# Patient Record
Sex: MALE | Race: BLACK OR AFRICAN AMERICAN | URBAN - METROPOLITAN AREA
[De-identification: names, ages, dates, MRNs, and addresses within clinical notes are randomized per-mention and may not be internally consistent; named-entity substitution may affect disease eponyms.]

---

## 2022-01-03 ENCOUNTER — INPATIENT (INPATIENT)
Facility: HOSPITAL | Age: 52
LOS: 2 days | Discharge: HOME | End: 2022-01-06
Attending: STUDENT IN AN ORGANIZED HEALTH CARE EDUCATION/TRAINING PROGRAM | Admitting: STUDENT IN AN ORGANIZED HEALTH CARE EDUCATION/TRAINING PROGRAM
Payer: COMMERCIAL

## 2022-01-03 VITALS
DIASTOLIC BLOOD PRESSURE: 63 MMHG | WEIGHT: 160.06 LBS | HEART RATE: 96 BPM | OXYGEN SATURATION: 98 % | SYSTOLIC BLOOD PRESSURE: 128 MMHG | RESPIRATION RATE: 20 BRPM

## 2022-01-03 DIAGNOSIS — K92.2 GASTROINTESTINAL HEMORRHAGE, UNSPECIFIED: ICD-10-CM

## 2022-01-03 DIAGNOSIS — D50.9 IRON DEFICIENCY ANEMIA, UNSPECIFIED: ICD-10-CM

## 2022-01-03 DIAGNOSIS — K29.70 GASTRITIS, UNSPECIFIED, WITHOUT BLEEDING: ICD-10-CM

## 2022-01-03 DIAGNOSIS — E51.9 THIAMINE DEFICIENCY, UNSPECIFIED: ICD-10-CM

## 2022-01-03 DIAGNOSIS — E53.8 DEFICIENCY OF OTHER SPECIFIED B GROUP VITAMINS: ICD-10-CM

## 2022-01-03 DIAGNOSIS — F10.10 ALCOHOL ABUSE, UNCOMPLICATED: ICD-10-CM

## 2022-01-03 DIAGNOSIS — K72.00 ACUTE AND SUBACUTE HEPATIC FAILURE WITHOUT COMA: ICD-10-CM

## 2022-01-03 DIAGNOSIS — D53.9 NUTRITIONAL ANEMIA, UNSPECIFIED: ICD-10-CM

## 2022-01-03 DIAGNOSIS — K70.30 ALCOHOLIC CIRRHOSIS OF LIVER WITHOUT ASCITES: ICD-10-CM

## 2022-01-03 DIAGNOSIS — K22.10 ULCER OF ESOPHAGUS WITHOUT BLEEDING: ICD-10-CM

## 2022-01-03 DIAGNOSIS — D69.6 THROMBOCYTOPENIA, UNSPECIFIED: ICD-10-CM

## 2022-01-03 DIAGNOSIS — U07.1 COVID-19: ICD-10-CM

## 2022-01-03 DIAGNOSIS — I85.10 SECONDARY ESOPHAGEAL VARICES WITHOUT BLEEDING: ICD-10-CM

## 2022-01-03 DIAGNOSIS — K22.6 GASTRO-ESOPHAGEAL LACERATION-HEMORRHAGE SYNDROME: ICD-10-CM

## 2022-01-03 DIAGNOSIS — I10 ESSENTIAL (PRIMARY) HYPERTENSION: ICD-10-CM

## 2022-01-03 DIAGNOSIS — K44.9 DIAPHRAGMATIC HERNIA WITHOUT OBSTRUCTION OR GANGRENE: ICD-10-CM

## 2022-01-03 LAB
ALBUMIN SERPL ELPH-MCNC: 3.6 G/DL — SIGNIFICANT CHANGE UP (ref 3.5–5.2)
ALP SERPL-CCNC: 138 U/L — HIGH (ref 30–115)
ALT FLD-CCNC: 31 U/L — SIGNIFICANT CHANGE UP (ref 0–41)
ANION GAP SERPL CALC-SCNC: 21 MMOL/L — HIGH (ref 7–14)
APTT BLD: 34.9 SEC — SIGNIFICANT CHANGE UP (ref 27–39.2)
AST SERPL-CCNC: 140 U/L — HIGH (ref 0–41)
BASOPHILS # BLD AUTO: 0.04 K/UL — SIGNIFICANT CHANGE UP (ref 0–0.2)
BASOPHILS NFR BLD AUTO: 0.4 % — SIGNIFICANT CHANGE UP (ref 0–1)
BILIRUB SERPL-MCNC: 1.6 MG/DL — HIGH (ref 0.2–1.2)
BLD GP AB SCN SERPL QL: SIGNIFICANT CHANGE UP
BUN SERPL-MCNC: 13 MG/DL — SIGNIFICANT CHANGE UP (ref 10–20)
CALCIUM SERPL-MCNC: 8.4 MG/DL — LOW (ref 8.5–10.1)
CHLORIDE SERPL-SCNC: 96 MMOL/L — LOW (ref 98–110)
CO2 SERPL-SCNC: 23 MMOL/L — SIGNIFICANT CHANGE UP (ref 17–32)
CREAT SERPL-MCNC: 0.7 MG/DL — SIGNIFICANT CHANGE UP (ref 0.7–1.5)
EOSINOPHIL # BLD AUTO: 0.01 K/UL — SIGNIFICANT CHANGE UP (ref 0–0.7)
EOSINOPHIL NFR BLD AUTO: 0.1 % — SIGNIFICANT CHANGE UP (ref 0–8)
GLUCOSE SERPL-MCNC: 184 MG/DL — HIGH (ref 70–99)
HCT VFR BLD CALC: 26.2 % — LOW (ref 42–52)
HCT VFR BLD CALC: 33.1 % — LOW (ref 42–52)
HGB BLD-MCNC: 11.1 G/DL — LOW (ref 14–18)
HGB BLD-MCNC: 8.8 G/DL — LOW (ref 14–18)
IMM GRANULOCYTES NFR BLD AUTO: 0.4 % — HIGH (ref 0.1–0.3)
INR BLD: 1.64 RATIO — HIGH (ref 0.65–1.3)
LIDOCAIN IGE QN: 29 U/L — SIGNIFICANT CHANGE UP (ref 7–60)
LYMPHOCYTES # BLD AUTO: 4.69 K/UL — HIGH (ref 1.2–3.4)
LYMPHOCYTES # BLD AUTO: 44.7 % — SIGNIFICANT CHANGE UP (ref 20.5–51.1)
MCHC RBC-ENTMCNC: 24.8 PG — LOW (ref 27–31)
MCHC RBC-ENTMCNC: 25.2 PG — LOW (ref 27–31)
MCHC RBC-ENTMCNC: 33.5 G/DL — SIGNIFICANT CHANGE UP (ref 32–37)
MCHC RBC-ENTMCNC: 33.6 G/DL — SIGNIFICANT CHANGE UP (ref 32–37)
MCV RBC AUTO: 74 FL — LOW (ref 80–94)
MCV RBC AUTO: 75.1 FL — LOW (ref 80–94)
MONOCYTES # BLD AUTO: 1.05 K/UL — HIGH (ref 0.1–0.6)
MONOCYTES NFR BLD AUTO: 10 % — HIGH (ref 1.7–9.3)
NEUTROPHILS # BLD AUTO: 4.67 K/UL — SIGNIFICANT CHANGE UP (ref 1.4–6.5)
NEUTROPHILS NFR BLD AUTO: 44.4 % — SIGNIFICANT CHANGE UP (ref 42.2–75.2)
NRBC # BLD: 0 /100 WBCS — SIGNIFICANT CHANGE UP (ref 0–0)
NRBC # BLD: 0 /100 WBCS — SIGNIFICANT CHANGE UP (ref 0–0)
PLATELET # BLD AUTO: 45 K/UL — LOW (ref 130–400)
PLATELET # BLD AUTO: 63 K/UL — LOW (ref 130–400)
POTASSIUM SERPL-MCNC: 3.8 MMOL/L — SIGNIFICANT CHANGE UP (ref 3.5–5)
POTASSIUM SERPL-SCNC: 3.8 MMOL/L — SIGNIFICANT CHANGE UP (ref 3.5–5)
PROT SERPL-MCNC: 7.6 G/DL — SIGNIFICANT CHANGE UP (ref 6–8)
PROTHROM AB SERPL-ACNC: 18.8 SEC — HIGH (ref 9.95–12.87)
RBC # BLD: 3.49 M/UL — LOW (ref 4.7–6.1)
RBC # BLD: 4.47 M/UL — LOW (ref 4.7–6.1)
RBC # FLD: 14.5 % — SIGNIFICANT CHANGE UP (ref 11.5–14.5)
RBC # FLD: 15.2 % — HIGH (ref 11.5–14.5)
SARS-COV-2 RNA SPEC QL NAA+PROBE: DETECTED
SODIUM SERPL-SCNC: 140 MMOL/L — SIGNIFICANT CHANGE UP (ref 135–146)
WBC # BLD: 10.5 K/UL — SIGNIFICANT CHANGE UP (ref 4.8–10.8)
WBC # BLD: 6.05 K/UL — SIGNIFICANT CHANGE UP (ref 4.8–10.8)
WBC # FLD AUTO: 10.5 K/UL — SIGNIFICANT CHANGE UP (ref 4.8–10.8)
WBC # FLD AUTO: 6.05 K/UL — SIGNIFICANT CHANGE UP (ref 4.8–10.8)

## 2022-01-03 PROCEDURE — 71045 X-RAY EXAM CHEST 1 VIEW: CPT | Mod: 26

## 2022-01-03 PROCEDURE — 99223 1ST HOSP IP/OBS HIGH 75: CPT

## 2022-01-03 PROCEDURE — 76705 ECHO EXAM OF ABDOMEN: CPT | Mod: 26

## 2022-01-03 PROCEDURE — 43235 EGD DIAGNOSTIC BRUSH WASH: CPT

## 2022-01-03 PROCEDURE — 99285 EMERGENCY DEPT VISIT HI MDM: CPT | Mod: 25

## 2022-01-03 PROCEDURE — 74177 CT ABD & PELVIS W/CONTRAST: CPT | Mod: 26

## 2022-01-03 PROCEDURE — 93010 ELECTROCARDIOGRAM REPORT: CPT

## 2022-01-03 PROCEDURE — 99234 HOSP IP/OBS SM DT SF/LOW 45: CPT | Mod: 25

## 2022-01-03 RX ORDER — THIAMINE MONONITRATE (VIT B1) 100 MG
100 TABLET ORAL DAILY
Refills: 0 | Status: DISCONTINUED | OUTPATIENT
Start: 2022-01-03 | End: 2022-01-06

## 2022-01-03 RX ORDER — LACTULOSE 10 G/15ML
15 SOLUTION ORAL
Qty: 0 | Refills: 0 | DISCHARGE

## 2022-01-03 RX ORDER — PANTOPRAZOLE SODIUM 20 MG/1
8 TABLET, DELAYED RELEASE ORAL
Qty: 80 | Refills: 0 | Status: DISCONTINUED | OUTPATIENT
Start: 2022-01-03 | End: 2022-01-04

## 2022-01-03 RX ORDER — SODIUM CHLORIDE 9 MG/ML
1000 INJECTION, SOLUTION INTRAVENOUS ONCE
Refills: 0 | Status: COMPLETED | OUTPATIENT
Start: 2022-01-03 | End: 2022-01-03

## 2022-01-03 RX ORDER — ONDANSETRON 8 MG/1
8 TABLET, FILM COATED ORAL ONCE
Refills: 0 | Status: COMPLETED | OUTPATIENT
Start: 2022-01-03 | End: 2022-01-03

## 2022-01-03 RX ORDER — OCTREOTIDE ACETATE 200 UG/ML
50 INJECTION, SOLUTION INTRAVENOUS; SUBCUTANEOUS ONCE
Refills: 0 | Status: COMPLETED | OUTPATIENT
Start: 2022-01-03 | End: 2022-01-03

## 2022-01-03 RX ORDER — CEFTRIAXONE 500 MG/1
1000 INJECTION, POWDER, FOR SOLUTION INTRAMUSCULAR; INTRAVENOUS ONCE
Refills: 0 | Status: COMPLETED | OUTPATIENT
Start: 2022-01-03 | End: 2022-01-03

## 2022-01-03 RX ORDER — PANTOPRAZOLE SODIUM 20 MG/1
80 TABLET, DELAYED RELEASE ORAL ONCE
Refills: 0 | Status: COMPLETED | OUTPATIENT
Start: 2022-01-03 | End: 2022-01-03

## 2022-01-03 RX ORDER — FUROSEMIDE 40 MG
1 TABLET ORAL
Qty: 0 | Refills: 0 | DISCHARGE

## 2022-01-03 RX ORDER — CHLORHEXIDINE GLUCONATE 213 G/1000ML
1 SOLUTION TOPICAL DAILY
Refills: 0 | Status: DISCONTINUED | OUTPATIENT
Start: 2022-01-03 | End: 2022-01-06

## 2022-01-03 RX ORDER — SODIUM CHLORIDE 9 MG/ML
1000 INJECTION, SOLUTION INTRAVENOUS
Refills: 0 | Status: DISCONTINUED | OUTPATIENT
Start: 2022-01-03 | End: 2022-01-04

## 2022-01-03 RX ORDER — SODIUM CHLORIDE 9 MG/ML
1000 INJECTION INTRAMUSCULAR; INTRAVENOUS; SUBCUTANEOUS
Refills: 0 | Status: DISCONTINUED | OUTPATIENT
Start: 2022-01-03 | End: 2022-01-04

## 2022-01-03 RX ORDER — FOLIC ACID 0.8 MG
1 TABLET ORAL DAILY
Refills: 0 | Status: DISCONTINUED | OUTPATIENT
Start: 2022-01-03 | End: 2022-01-06

## 2022-01-03 RX ORDER — SODIUM CHLORIDE 9 MG/ML
1000 INJECTION INTRAMUSCULAR; INTRAVENOUS; SUBCUTANEOUS ONCE
Refills: 0 | Status: COMPLETED | OUTPATIENT
Start: 2022-01-03 | End: 2022-01-03

## 2022-01-03 RX ORDER — ERYTHROMYCIN ETHYLSUCCINATE 400 MG
250 TABLET ORAL EVERY 6 HOURS
Refills: 0 | Status: COMPLETED | OUTPATIENT
Start: 2022-01-03 | End: 2022-01-04

## 2022-01-03 RX ORDER — OCTREOTIDE ACETATE 200 UG/ML
50 INJECTION, SOLUTION INTRAVENOUS; SUBCUTANEOUS
Qty: 500 | Refills: 0 | Status: DISCONTINUED | OUTPATIENT
Start: 2022-01-03 | End: 2022-01-04

## 2022-01-03 RX ORDER — CEFTRIAXONE 500 MG/1
1000 INJECTION, POWDER, FOR SOLUTION INTRAMUSCULAR; INTRAVENOUS EVERY 24 HOURS
Refills: 0 | Status: DISCONTINUED | OUTPATIENT
Start: 2022-01-03 | End: 2022-01-06

## 2022-01-03 RX ORDER — METOCLOPRAMIDE HCL 10 MG
10 TABLET ORAL ONCE
Refills: 0 | Status: COMPLETED | OUTPATIENT
Start: 2022-01-03 | End: 2022-01-03

## 2022-01-03 RX ORDER — MAGNESIUM SULFATE 500 MG/ML
2 VIAL (ML) INJECTION ONCE
Refills: 0 | Status: COMPLETED | OUTPATIENT
Start: 2022-01-03 | End: 2022-01-03

## 2022-01-03 RX ORDER — PROPRANOLOL HCL 160 MG
0 CAPSULE, EXTENDED RELEASE 24HR ORAL
Qty: 0 | Refills: 0 | DISCHARGE

## 2022-01-03 RX ORDER — FOLIC ACID 0.8 MG
1 TABLET ORAL
Qty: 0 | Refills: 0 | DISCHARGE

## 2022-01-03 RX ORDER — LACTULOSE 10 G/15ML
10 SOLUTION ORAL DAILY
Refills: 0 | Status: DISCONTINUED | OUTPATIENT
Start: 2022-01-03 | End: 2022-01-06

## 2022-01-03 RX ORDER — METOCLOPRAMIDE HCL 10 MG
10 TABLET ORAL
Refills: 0 | Status: DISCONTINUED | OUTPATIENT
Start: 2022-01-03 | End: 2022-01-03

## 2022-01-03 RX ADMIN — Medication 25 GRAM(S): at 04:46

## 2022-01-03 RX ADMIN — SODIUM CHLORIDE 1000 MILLILITER(S): 9 INJECTION, SOLUTION INTRAVENOUS at 06:07

## 2022-01-03 RX ADMIN — PANTOPRAZOLE SODIUM 80 MILLIGRAM(S): 20 TABLET, DELAYED RELEASE ORAL at 06:22

## 2022-01-03 RX ADMIN — OCTREOTIDE ACETATE 10 MICROGRAM(S)/HR: 200 INJECTION, SOLUTION INTRAVENOUS; SUBCUTANEOUS at 06:23

## 2022-01-03 RX ADMIN — OCTREOTIDE ACETATE 10 MICROGRAM(S)/HR: 200 INJECTION, SOLUTION INTRAVENOUS; SUBCUTANEOUS at 09:46

## 2022-01-03 RX ADMIN — Medication 250 MILLIGRAM(S): at 20:43

## 2022-01-03 RX ADMIN — LACTULOSE 10 GRAM(S): 10 SOLUTION ORAL at 11:59

## 2022-01-03 RX ADMIN — PANTOPRAZOLE SODIUM 10 MG/HR: 20 TABLET, DELAYED RELEASE ORAL at 04:51

## 2022-01-03 RX ADMIN — Medication 1 MILLIGRAM(S): at 11:59

## 2022-01-03 RX ADMIN — PANTOPRAZOLE SODIUM 10 MG/HR: 20 TABLET, DELAYED RELEASE ORAL at 20:43

## 2022-01-03 RX ADMIN — SODIUM CHLORIDE 75 MILLILITER(S): 9 INJECTION INTRAMUSCULAR; INTRAVENOUS; SUBCUTANEOUS at 22:29

## 2022-01-03 RX ADMIN — ONDANSETRON 8 MILLIGRAM(S): 8 TABLET, FILM COATED ORAL at 06:10

## 2022-01-03 RX ADMIN — OCTREOTIDE ACETATE 10 MICROGRAM(S)/HR: 200 INJECTION, SOLUTION INTRAVENOUS; SUBCUTANEOUS at 20:43

## 2022-01-03 RX ADMIN — OCTREOTIDE ACETATE 50 MICROGRAM(S): 200 INJECTION, SOLUTION INTRAVENOUS; SUBCUTANEOUS at 06:22

## 2022-01-03 RX ADMIN — SODIUM CHLORIDE 75 MILLILITER(S): 9 INJECTION INTRAMUSCULAR; INTRAVENOUS; SUBCUTANEOUS at 09:21

## 2022-01-03 RX ADMIN — CEFTRIAXONE 100 MILLIGRAM(S): 500 INJECTION, POWDER, FOR SOLUTION INTRAMUSCULAR; INTRAVENOUS at 06:23

## 2022-01-03 RX ADMIN — CEFTRIAXONE 100 MILLIGRAM(S): 500 INJECTION, POWDER, FOR SOLUTION INTRAMUSCULAR; INTRAVENOUS at 22:25

## 2022-01-03 RX ADMIN — Medication 10 MILLIGRAM(S): at 04:45

## 2022-01-03 NOTE — H&P ADULT - NSHPSOCIALHISTORY_GEN_ALL_CORE
Smoker 1/4 pack a day for 30 years. (7 pack years)  Alcohol use: 1 cup of vodka every few days (last drink on 1 day prior to admission)

## 2022-01-03 NOTE — ED PROVIDER NOTE - CLINICAL SUMMARY MEDICAL DECISION MAKING FREE TEXT BOX
I personally evaluated the patient. I reviewed the Resident’s or Physician Assistant’s note (as assigned above), and agree with the findings and plan except as documented in my note. Patient evaluated for hematemesis. Labs, ekg, cxr performed in the ED. Given abx, reglan, ppi, and octreotide in the ED. No active vomiting or airway compromise in the ED. D/w GI and ICU. Pt admitted to SDU for further evaluation and treatment.

## 2022-01-03 NOTE — H&P ADULT - ATTENDING COMMENTS
REVIEW OF SYSTEMS and PHYSICAL EXAM as above.         ASSESSMENT AND PLAN:    51 year old PMH of alcoholic cirrhosis with Esophageal varices s/p band ligation 4 months ago in [facility] and HTN  presented to the ER after having 6 vomiting episodes of dark blood. found to have COVID 19. admitted to medicine for further management.     # Acute on chronic microcytic anemia 2/2 Upper GI bleed likely esophageal varices:   # Decompensated Alcoholic Liver Cirrhosis: MELD-Na Score - 13  - pt is hemodynamically stable   - Hb on admission 11, MCV 74, PLT 63  - repeat Hb down to 8.8  - s/p EGD with GI The varices were small and no stigmata of bleeding, here was evidence of blood in the stomach which was adequately suctioned to better visualize the stomach. Despite aggressive suctioning, a large clot was obscuring the view of the fundus and could not be fully evaluated.  - GI is planning for repeat EGD in the AM  - C/w octreotide drip at 50 mcg/hr  - C/w PPI (pantoprazole) drip at 8 mg/hr  - C/w ceftriaxone 1000 mg daily for 5 days  - C/w rifaximin 550 mg twice daily  - C/w lactulose targeting 2-3 BM per day  - Keep NPO,  IV NS 75 cc/hr   - INR: 1.68, keep INR<2.5  - Lasix and propranolol to be held for now  - US abdomen ordered    # COVID-19 infection  - Symptoms started 2 days PTP  - Patient on RA   - Consult ID- might benefit from monoclonal Ab    # HTN- Hold propranolol for now    # Alcohol Use  # Suspected thiamine and folate deficiency:  - Last drink 1 day PTP  - Monitor for alcohol withdrawal  - c/w supplement     Rest as above  Discussed with patient and house staff REVIEW OF SYSTEMS and PHYSICAL EXAM as above.         ASSESSMENT AND PLAN:    51 year old PMH of alcoholic cirrhosis with Esophageal varices s/p band ligation 4 months ago in [facility] and HTN  presented to the ER after having 6 vomiting episodes of dark blood. found to have COVID 19. admitted to medicine for further management.     # Acute on chronic microcytic anemia 2/2 Upper GI bleed likely esophageal varices:   # Decompensated Alcoholic Liver Cirrhosis: MELD-Na Score - 13  - pt is hemodynamically stable   - Hb on admission 11, MCV 74, PLT 63  - repeat Hb down to 8.8  - s/p EGD with GI The varices were small and no stigmata of bleeding, here was evidence of blood in the stomach which was adequately suctioned to better visualize the stomach. Despite aggressive suctioning, a large clot was obscuring the view of the fundus and could not be fully evaluated.  - GI is planning for repeat EGD in the AM  - will give 2 doses of erythromycin 250 mg STAT and repeat at 10 PM  - CTA A/P per GI Recs   - C/w octreotide drip at 50 mcg/hr  - C/w PPI (pantoprazole) drip at 8 mg/hr  - C/w ceftriaxone 1000 mg daily for 5 days  - C/w rifaximin 550 mg twice daily  - C/w lactulose targeting 2-3 BM per day  - Keep NPO,  IV NS 75 cc/hr   - INR: 1.68, keep INR<2.5  - Lasix and propranolol to be held for now  - US abdomen ordered    # COVID-19 infection  - Symptoms started 2 days PTP  - Patient on RA   - Consult ID- might benefit from monoclonal Ab    # HTN- Hold propranolol for now    # Alcohol Use  # Suspected thiamine and folate deficiency:  - Last drink 1 day PTP  - Monitor for alcohol withdrawal  - c/w supplement     Rest as above  Discussed with patient and house staff

## 2022-01-03 NOTE — CONSULT NOTE ADULT - SUBJECTIVE AND OBJECTIVE BOX
Gastroenterology Consultation:    Patient is a 51y old  Male who presents with a chief complaint of       Admitted on: 01-03-22      HPI:     52 y/o M w/ pMHx of Decompensated (Hepatic encephalopathy on xifaxan and lactulose) liver cirrhosis (MELD-Na 13, CHILD A) d/t to etoh abuse presents w/ x1 day hx of hematemesis x3 episodes moderate volume. Pt had COVID symptoms w/ nausea yesterday and began vomiting blood at midnight. Pt denies any other symptoms      Prior EGD: 4 months ago per patient. Large esophageal varcies and were banded    Prior Colonoscopy: none      PAST MEDICAL & SURGICAL HISTORY:        FAMILY HISTORY:      Social History:  Tobacco: denies  Alcohol: last drink x1 in am on 1/2  Drugs: denies    Home Medications:        MEDICATIONS  (STANDING):  cefTRIAXone   IVPB 1000 milliGRAM(s) IV Intermittent every 24 hours  octreotide  Infusion 50 MICROgram(s)/Hr (10 mL/Hr) IV Continuous <Continuous>  pantoprazole Infusion 8 mG/Hr (10 mL/Hr) IV Continuous <Continuous>    MEDICATIONS  (PRN):      Allergies  No Known Allergies      Review of Systems:   Constitutional:  No Fever, No Chills  ENT/Mouth:  No Hearing Changes,  No Difficulty Swallowing  Eyes:  No Eye Pain, No Vision Changes  Cardiovascular:  No Chest Pain, No Palpitations  Respiratory:  No Cough, No Dyspnea  Gastrointestinal:  As described in HPI  Musculoskeletal:  No Joint Swelling, No Back Pain  Skin:  No Skin Lesions, No Jaundice  Neuro:  No Syncope, No Dizziness  Heme/Lymph:  No Bruising, No Bleeding.          Physical Examination:  T(C): 37 (01-03-22 @ 03:52), Max: 37 (01-03-22 @ 03:52)  HR: 96 (01-03-22 @ 07:37) (93 - 96)  BP: 113/64 (01-03-22 @ 07:37) (113/64 - 128/63)  RR: 17 (01-03-22 @ 07:37) (16 - 20)  SpO2: 99% (01-03-22 @ 07:37) (98% - 99%)    Weight (kg): 72.6 (01-03-22 @ 03:45)        GENERAL: AAOx3, no acute distress.  HEAD:  Atraumatic, Normocephalic  EYES: conjunctiva and sclera clear  NECK: Supple, no JVD or thyromegaly  CHEST/LUNG: Clear to auscultation bilaterally; No wheeze, rhonchi, or rales  HEART: Regular rate and rhythm; normal S1, S2, No murmurs.  ABDOMEN: Soft, nontender, nondistended; Bowel sounds present. ALANA: brown stool  NEUROLOGY: No asterixis or tremor.   SKIN: Intact, no jaundice        Data:                        11.1   10.50 )-----------( 63       ( 03 Jan 2022 04:00 )             33.1     Hgb Trend:  11.1  01-03-22 @ 04:00      01-03    140  |  96<L>  |  13  ----------------------------<  184<H>  3.8   |  23  |  0.7    Ca    8.4<L>      03 Jan 2022 04:00    TPro  7.6  /  Alb  3.6  /  TBili  1.6<H>  /  DBili  x   /  AST  140<H>  /  ALT  31  /  AlkPhos  138<H>  01-03    Liver panel trend:  TBili 1.6   /      /   ALT 31   /   AlkP 138   /   Tptn 7.6   /   Alb 3.6    /   DBili --      01-03      PT/INR - ( 03 Jan 2022 04:00 )   PT: 18.80 sec;   INR: 1.64 ratio         PTT - ( 03 Jan 2022 04:00 )  PTT:34.9 sec        Radiology:

## 2022-01-03 NOTE — H&P ADULT - NSHPPHYSICALEXAM_GEN_ALL_CORE
GA: alert, oriented not in distress  HEENT: non remarkable  Heart: Normal S1 S2   Lungs: clear air sounds  Abdomen: soft non tender, hepatomegaly (liver edge palpated under rib edge), no shifting dullness  LE: no edema, pulses preserved

## 2022-01-03 NOTE — CHART NOTE - NSCHARTNOTEFT_GEN_A_CORE
PACU ANESTHESIA ADMISSION NOTE      Procedure:   Post op diagnosis:      ____  Intubated  TV:______       Rate: ______      FiO2: ______    __x__  Patent Airway    __x__  Full return of protective reflexes    __x__  Full recovery from anesthesia / back to baseline     Vitals:   See Anesthesia record  T- 97.9 P- 92 R-18 B/P- 140/68 SPO2- 99% on RA    Mental Status:  __x__ Awake   __x___ Alert   _____ Drowsy   _____ Sedated    Nausea/Vomiting:  __x__ NO  ______Yes,   See Post - Op Orders          Pain Scale (0-10):  ___0__    Treatment: ____ None    ____ See Post - Op/PCA Orders    Post - Operative Fluids:   ____ Oral   __x__ See Post - Op Orders    Plan: Discharge:   ____Home       ___x__Floor     _____Critical Care    _____  Other:_________________    Comments: No anesthesia complications/issues noted. Discharge/READMIT to ED when PACU criteria met.

## 2022-01-03 NOTE — H&P ADULT - HISTORY OF PRESENT ILLNESS
51 year old man known to have:  - HTN on propranolol  - History of alcohol use since 31 years  - Cirhosis due to alcohol use   - Esophageal varices s/p band ligation 4 months ago in [facility]  - History of Ascites- tapped before     The patient presented to the ER after having 6 vomiting episodes of dark blood. Patient reports that it started at midnight with moderate amounts of blood (cup size). He also reports dark stools since last night, no hematochezia. No abdominal pain or diarrhea. The patient reports that the hematemesis was the first time in his lifetime. Patient also endorses having chills and dry cough since two days prior to presentation. Patient denies shortness of breath or chest pain. He reports dizziness especially upon standing up with no falls. The patient had endoscopy done 4 months ago that showed esophageal varices that were band ligated.     In the ER, patient is hemodynamically stable, mildly tachycardic ()   51 year old man known to have:  - HTN on propranolol  - History of alcohol use since 31 years  - Cirhosis due to alcohol use   - Esophageal varices s/p band ligation 4 months ago in [facility]  - History of Ascites- tapped before     The patient presented to the ER after having 6 vomiting episodes of dark blood. Patient reports that it started at midnight with moderate amounts of blood (cup size). He also reports dark stools since last night, no hematochezia. No abdominal pain or diarrhea. The patient reports that the hematemesis was the first time in his lifetime. Patient also endorses having chills and dry cough since two days prior to presentation. Patient denies shortness of breath or chest pain. He reports dizziness especially upon standing up with no falls. The patient had endoscopy done 4 months ago that showed esophageal varices that were band ligated.     In the ER, patient is hemodynamically stable, mildly tachycardic   BP: 128/63 mmHg  HR: 96  Spo2: 98% on RA  Temp: 98.6 F

## 2022-01-03 NOTE — ED ADULT NURSE NOTE - OBJECTIVE STATEMENT
BIBA pt states he  has hx of liver cirrhosis and began vomiting blood at midnight. Denies any chest pain, palpitations, respiratory distress, dizziness, visual disturbances. pt alert and oriented. pt states he vomited approximately 6-7 times prior to coming into the hospital.

## 2022-01-03 NOTE — H&P ADULT - ASSESSMENT
51 year old man known to have:  - HTN on propranolol  - History of alcohol use since 31 years  - Cirhosis due to alcohol use   - Esophageal varices s/p band ligation 4 months ago in [facility]  - History of Ascites- tapped before     The patient presented to the ER after having 6 vomiting episodes of dark blood. Patient reports that it started at midnight with moderate amounts of blood (cup size). He also reports dark stools since last night, no hematochezia. No abdominal pain or diarrhea. The patient reports that the hematemesis was the first time in his lifetime. Patient also endorses having chills and dry cough since two days prior to presentation. Patient denies shortness of breath or chest pain. He reports dizziness especially upon standing up with no falls. The patient had endoscopy done 4 months ago that showed esophageal varices that were band ligated.     In the ER, patient is hemodynamically stable, mildly tachycardic   BP: 128/63 mmHg  HR: 96  Spo2: 98% on RA  Temp: 98.6 F       51 year old man known to have:  - HTN on propranolol  - History of alcohol use since 31 years  - Cirrhosis due to alcohol use   - Esophageal varices s/p band ligation 4 months ago in [facility]  - History of Ascites- tapped before     The patient presented to the ER after having 6 vomiting episodes of dark blood. Patient reports that it started at midnight with moderate amounts of blood (cup size). He also reports dark stools since last night, no hematochezia. No abdominal pain or diarrhea. The patient reports that the hematemesis was the first time in his lifetime. Patient also endorses having chills and dry cough since two days prior to presentation. Patient denies shortness of breath or chest pain. He reports dizziness especially upon standing up with no falls. The patient had endoscopy done 4 months ago that showed esophageal varices that were band ligated.     In the ER, patient is hemodynamically stable, mildly tachycardic   BP: 128/63 mmHg  HR: 96  Spo2: 98% on RA  Temp: 98.6 F    #Upper GI bleed likely esophageal varices  #Liver Cirrhosis secondary to alcohol use  - s/p PPI IV bolus, octreotide IV bolus, and 2 Liters of fluids   - C/w octreotide drip at 50 mcg/hr  - C/w PPI (pantoprazole) drip at 8 mg/hr  - C/w ceftriaxone 1000 mg daily for 5 days  - C/w rifaximin 550 mg twice daily  - C/w lactulose targeting 2-3 BM per day  - Plan for EGD to be done later today as per GI  -  IV NS 75 cc/hr   - 2x 18 guage IV lines  - INR: 1.68, keep INR<2.5  - Platelet count 63, no need for transfusion now  - Hb: 11.1, CBC BID   - MELD score: 14 - 6% estimated 3 month mortality  - CHILD class A  - Lasix and propranolol to be held for now  - US abdomen ordered: F/U results, ascites less likely  - TTE ordered: to assess baseline EF, FU results      # COVID-19 infection  - Symptoms started 2 days PTP  - Patient on RA   - Consult ID- might benefit from monoclonal Ab    # HTN  - Hold propranolol for now  - BP soft, patient tachycardic    # Alcohol Use  - Last drink 1 day PTP  - Monitor for alcohol withdrawal    #Misc:  - Diet: NPO   - DVT prophylaxis: SCD for now  - GI prophylaxis: on protonix drip for upper GI bleed  - Disposition: Acute, pending EGD    51 year old man known to have:  - HTN on propranolol  - History of alcohol use since 31 years  - Cirrhosis due to alcohol use   - Esophageal varices s/p band ligation 4 months ago in [facility]  - History of Ascites- tapped before     The patient presented to the ER after having 6 vomiting episodes of dark blood. Patient reports that it started at midnight with moderate amounts of blood (cup size). He also reports dark stools since last night, no hematochezia. No abdominal pain or diarrhea. The patient reports that the hematemesis was the first time in his lifetime. Patient also endorses having chills and dry cough since two days prior to presentation. Patient denies shortness of breath or chest pain. He reports dizziness especially upon standing up with no falls. The patient had endoscopy done 4 months ago that showed esophageal varices that were band ligated.     In the ER, patient is hemodynamically stable, mildly tachycardic   BP: 128/63 mmHg  HR: 96  Spo2: 98% on RA  Temp: 98.6 F    #Upper GI bleed likely esophageal varices  #Liver Cirrhosis secondary to alcohol use  - s/p PPI IV bolus, octreotide IV bolus, and 2 Liters of fluids   - C/w octreotide drip at 50 mcg/hr  - C/w PPI (pantoprazole) drip at 8 mg/hr  - C/w ceftriaxone 1000 mg daily for 5 days  - C/w rifaximin 550 mg twice daily  - C/w lactulose targeting 2-3 BM per day  - Plan for EGD to be done later today as per GI  -  IV NS 75 cc/hr   - 2x 18 guage IV lines  - INR: 1.68, keep INR<2.5  - Platelet count 63, no need for transfusion now  - Hb: 11.1, CBC BID   - MELD score: 14 - 6% estimated 3 month mortality  - CHILD class A  - Lasix and propranolol to be held for now  - US abdomen ordered: F/U results, ascites less likely  - TTE ordered: to assess baseline EF, FU results      # COVID-19 infection  - Symptoms started 2 days PTP  - Patient on RA   - Consult ID- might benefit from monoclonal Ab    # HTN  - Hold propranolol for now  - BP soft, patient tachycardic    # Alcohol Use  - Last drink 1 day PTP  - Monitor for alcohol withdrawal    #Misc:  - Diet: NPO   - DVT prophylaxis: SCD for now  - GI prophylaxis: on protonix drip for upper GI bleed  - Disposition: Acute, pending EGD

## 2022-01-03 NOTE — H&P ADULT - NSHPLABSRESULTS_GEN_ALL_CORE
cWBC Count: 10.50 K/uL   RBC Count: 4.47 M/uL   Hemoglobin: 11.1 g/dL   Hematocrit: 33.1 %   Mean Cell Volume: 74.0 fL   Mean Cell Hemoglobin: 24.8 pg   Mean Cell Hemoglobin Conc: 33.5 g/dL   Red Cell Distrib Width: 15.2 %   Platelet Count - Automated: 63: slide confirms count K/uL   Auto Neutrophil #: 4.67 K/uL   Auto Lymphocyte #: 4.69 K/uL   Auto Monocyte #: 1.05 K/uL   Auto Eosinophil #: 0.01 K/uL   Auto Basophil #: 0.04 K/uL   Auto Neutrophil %: 44.4: Differential percentages must be correlated with absolute numbers for   clinical significance. %   Auto Lymphocyte %: 44.7 %   Auto Monocyte %: 10.0 %   Auto Eosinophil %: 0.1 %   Auto Basophil %: 0.4 %   Auto Immature Granulocyte %: 0.4: (Includes meta, myelo and promyelocytes) %   Nucleated RBC: 0 /100 WBCs     Sodium, Serum: 140 mmol/L   Potassium, Serum: 3.8 mmol/L   Chloride, Serum: 96 mmol/L   Carbon Dioxide, Serum: 23 mmol/L   Anion Gap, Serum: 21 mmol/L   Blood Urea Nitrogen, Serum: 13 mg/dL   Creatinine, Serum: 0.7 mg/dL   Glucose, Serum: 184 mg/dL   Calcium, Total Serum: 8.4 mg/dL   Protein Total, Serum: 7.6 g/dL   Albumin, Serum: 3.6 g/dL   Bilirubin Total, Serum: 1.6 mg/dL   Alkaline Phosphatase, Serum: 138 U/L   Aspartate Aminotransferase (AST/SGOT): 140 U/L   Alanine Aminotransferase (ALT/SGPT): 31 U/L     < from: 12 Lead ECG (01.03.22 @ 03:48) >  Diagnosis Line Sinus rhythm with frequent Premature ventricular complexes and Premature  atrial complexes  Prolonged QT  Abnormal ECG  < end of copied text >    < from: Xray Chest 1 View-PORTABLE IMMEDIATE (01.03.22 @ 04:57) >  Impression:  No radiographic evidence of acute cardiopulmonary disease.  < end of copied text >    COVID-19 PCR: Detected cWBC Count: 10.50 K/uL   RBC Count: 4.47 M/uL   Hemoglobin: 11.1 g/dL   Hematocrit: 33.1 %   Mean Cell Volume: 74.0 fL   Mean Cell Hemoglobin: 24.8 pg   Mean Cell Hemoglobin Conc: 33.5 g/dL   Red Cell Distrib Width: 15.2 %   Platelet Count - Automated: 63: slide confirms count K/uL   Auto Neutrophil #: 4.67 K/uL   Auto Lymphocyte #: 4.69 K/uL   Auto Monocyte #: 1.05 K/uL   Auto Eosinophil #: 0.01 K/uL   Auto Basophil #: 0.04 K/uL   Auto Neutrophil %: 44.4: Differential percentages must be correlated with absolute numbers for   clinical significance. %   Auto Lymphocyte %: 44.7 %   Auto Monocyte %: 10.0 %   Auto Eosinophil %: 0.1 %   Auto Basophil %: 0.4 %   Auto Immature Granulocyte %: 0.4: (Includes meta, myelo and promyelocytes) %   Nucleated RBC: 0 /100 WBCs     Sodium, Serum: 140 mmol/L   Potassium, Serum: 3.8 mmol/L   Chloride, Serum: 96 mmol/L   Carbon Dioxide, Serum: 23 mmol/L   Anion Gap, Serum: 21 mmol/L   Blood Urea Nitrogen, Serum: 13 mg/dL   Creatinine, Serum: 0.7 mg/dL   Glucose, Serum: 184 mg/dL   Calcium, Total Serum: 8.4 mg/dL   Protein Total, Serum: 7.6 g/dL   Albumin, Serum: 3.6 g/dL   Bilirubin Total, Serum: 1.6 mg/dL   Alkaline Phosphatase, Serum: 138 U/L   Aspartate Aminotransferase (AST/SGOT): 140 U/L   Alanine Aminotransferase (ALT/SGPT): 31 U/L     < from: 12 Lead ECG (01.03.22 @ 03:48) >  Diagnosis Line Sinus rhythm with frequent Premature ventricular complexes and Premature  atrial complexes  Prolonged QT  Abnormal ECG  < end of copied text >    < from: Xray Chest 1 View-PORTABLE IMMEDIATE (01.03.22 @ 04:57) >  Impression:  No radiographic evidence of acute cardiopulmonary disease.  < end of copied text >    COVID-19 PCR: Detected    < from: EGD (01.03.22 @ 09:00) >    Esophagus Mucosa Grade D esophagitis with contact bleeding was seen in the  middle third of the esophagus and lower third of the esophagus, compatible with  nonspecific erosive esophagitis.   Protruding lesions 2 cords of varices were seen in the lower third of the  esophagus. The varices were not bleeding. The varices were small and no stigmata  of bleeding.   Stomach Mucosa Diffuse erythema of the mucosa was noted in the stomach body.  These findings are compatible with non-erosive gastritis.   Additional stomach findings There was evidence of blood in the stomach which was  adequately suctioned to better visualize the stomach. Despite aggressive  suctioning, a large clot was obscuring the view of the fundus and could not be  fully evaluated..   Duodenum Mucosa Normal mucosa was noted in the first part of the duodenum and  second part of the duodenum.     < end of copied text >

## 2022-01-03 NOTE — ED PROVIDER NOTE - ATTENDING CONTRIBUTION TO CARE
52 yo M pmh liver cirrhosis, esophageal varices pw hematemesis. 3 episodes bloody vomiting for the past day. Recently had viral sx associated with nausea, mild cough, epigastric pain for the past few days. NO f/c, no diarrhea, no cp, no sob, no palpitations, no LH, no dizziness, no back pain, no urinary sx.     CONSTITUTIONAL: Well-developed; well-nourished; in no acute distress. Sitting up and providing appropriate history and physical examination  SKIN: skin exam is warm and dry, no acute rash.  HEAD: Normocephalic; atraumatic.  EYES: PERRL, 3 mm bilateral, no nystagmus, EOM intact; conjunctiva and sclera clear.  ENT: No nasal discharge; airway clear.  NECK: Supple; non tender. + full passive ROM in all directions. No JVD  CARD: S1, S2 normal; no murmurs, gallops, or rubs. Regular rate and rhythm. + Symmetric Strong Pulses  RESP: No wheezes, rales or rhonchi. Good air movement bilaterally  ABD: soft; non-distended; non-tender. No Rebound, No Guarding, No signs of peritonitis, No CVA tenderness. No pulsatile abdominal mass. + Strong and Symmetric Pulses  EXT: Normal ROM. No clubbing, cyanosis or edema. Dp and Pt Pulses intact. Cap refill less than 3 seconds  NEURO: CN 2-12 intact, normal finger to nose, normal romberg, stable gait, no sensory or motor deficits, Alert, oriented, grossly unremarkable. No Focal deficits. GCS 15. NIH 0  PSYCH: Cooperative, appropriate.

## 2022-01-03 NOTE — ED PROVIDER NOTE - OBJECTIVE STATEMENT
50 y/o M w/ pMHx of Decompensated (Hepatic encephalopathy on xifaxan and lactulose) liver cirrhosis (MELD-Na 13, CHILD A) d/t to etoh abuse presents w/ x1 day hx of hematemesis x3 episodes moderate volume. Pt had COVID symptoms w/ nausea yesterday and began vomiting blood at midnight. Pt denies any other symptoms

## 2022-01-04 LAB
ALBUMIN SERPL ELPH-MCNC: 2.8 G/DL — LOW (ref 3.5–5.2)
ALP SERPL-CCNC: 88 U/L — SIGNIFICANT CHANGE UP (ref 30–115)
ALT FLD-CCNC: 23 U/L — SIGNIFICANT CHANGE UP (ref 0–41)
AMMONIA BLD-MCNC: 51 UMOL/L — SIGNIFICANT CHANGE UP (ref 11–55)
ANION GAP SERPL CALC-SCNC: 14 MMOL/L — SIGNIFICANT CHANGE UP (ref 7–14)
APTT BLD: 34.4 SEC — SIGNIFICANT CHANGE UP (ref 27–39.2)
AST SERPL-CCNC: 95 U/L — HIGH (ref 0–41)
BILIRUB SERPL-MCNC: 1.4 MG/DL — HIGH (ref 0.2–1.2)
BLD GP AB SCN SERPL QL: SIGNIFICANT CHANGE UP
BUN SERPL-MCNC: 10 MG/DL — SIGNIFICANT CHANGE UP (ref 10–20)
CALCIUM SERPL-MCNC: 7.3 MG/DL — LOW (ref 8.5–10.1)
CHLORIDE SERPL-SCNC: 105 MMOL/L — SIGNIFICANT CHANGE UP (ref 98–110)
CO2 SERPL-SCNC: 23 MMOL/L — SIGNIFICANT CHANGE UP (ref 17–32)
CREAT SERPL-MCNC: 0.7 MG/DL — SIGNIFICANT CHANGE UP (ref 0.7–1.5)
GLUCOSE SERPL-MCNC: 123 MG/DL — HIGH (ref 70–99)
HCT VFR BLD CALC: 22 % — LOW (ref 42–52)
HGB BLD-MCNC: 7.4 G/DL — LOW (ref 14–18)
INR BLD: 1.63 RATIO — HIGH (ref 0.65–1.3)
MAGNESIUM SERPL-MCNC: 1.7 MG/DL — LOW (ref 1.8–2.4)
MCHC RBC-ENTMCNC: 25.3 PG — LOW (ref 27–31)
MCHC RBC-ENTMCNC: 33.6 G/DL — SIGNIFICANT CHANGE UP (ref 32–37)
MCV RBC AUTO: 75.3 FL — LOW (ref 80–94)
NRBC # BLD: 0 /100 WBCS — SIGNIFICANT CHANGE UP (ref 0–0)
PLATELET # BLD AUTO: 54 K/UL — LOW (ref 130–400)
POTASSIUM SERPL-MCNC: 3.9 MMOL/L — SIGNIFICANT CHANGE UP (ref 3.5–5)
POTASSIUM SERPL-SCNC: 3.9 MMOL/L — SIGNIFICANT CHANGE UP (ref 3.5–5)
PROT SERPL-MCNC: 5.8 G/DL — LOW (ref 6–8)
PROTHROM AB SERPL-ACNC: 18.7 SEC — HIGH (ref 9.95–12.87)
RBC # BLD: 2.92 M/UL — LOW (ref 4.7–6.1)
RBC # FLD: 14.7 % — HIGH (ref 11.5–14.5)
SODIUM SERPL-SCNC: 142 MMOL/L — SIGNIFICANT CHANGE UP (ref 135–146)
WBC # BLD: 5.85 K/UL — SIGNIFICANT CHANGE UP (ref 4.8–10.8)
WBC # FLD AUTO: 5.85 K/UL — SIGNIFICANT CHANGE UP (ref 4.8–10.8)

## 2022-01-04 PROCEDURE — 43239 EGD BIOPSY SINGLE/MULTIPLE: CPT

## 2022-01-04 PROCEDURE — 99233 SBSQ HOSP IP/OBS HIGH 50: CPT | Mod: 25

## 2022-01-04 PROCEDURE — 88305 TISSUE EXAM BY PATHOLOGIST: CPT | Mod: 26

## 2022-01-04 PROCEDURE — 88312 SPECIAL STAINS GROUP 1: CPT | Mod: 26

## 2022-01-04 PROCEDURE — 99233 SBSQ HOSP IP/OBS HIGH 50: CPT

## 2022-01-04 RX ORDER — MAGNESIUM SULFATE 500 MG/ML
2 VIAL (ML) INJECTION ONCE
Refills: 0 | Status: COMPLETED | OUTPATIENT
Start: 2022-01-04 | End: 2022-01-04

## 2022-01-04 RX ORDER — PANTOPRAZOLE SODIUM 20 MG/1
40 TABLET, DELAYED RELEASE ORAL
Refills: 0 | Status: DISCONTINUED | OUTPATIENT
Start: 2022-01-04 | End: 2022-01-06

## 2022-01-04 RX ORDER — METOCLOPRAMIDE HCL 10 MG
5 TABLET ORAL
Refills: 0 | Status: DISCONTINUED | OUTPATIENT
Start: 2022-01-04 | End: 2022-01-04

## 2022-01-04 RX ADMIN — Medication 5 MILLIGRAM(S): at 15:23

## 2022-01-04 RX ADMIN — Medication 100 MILLIGRAM(S): at 12:12

## 2022-01-04 RX ADMIN — PANTOPRAZOLE SODIUM 10 MG/HR: 20 TABLET, DELAYED RELEASE ORAL at 12:11

## 2022-01-04 RX ADMIN — Medication 5 MILLIGRAM(S): at 09:30

## 2022-01-04 RX ADMIN — Medication 250 MILLIGRAM(S): at 06:04

## 2022-01-04 RX ADMIN — CHLORHEXIDINE GLUCONATE 1 APPLICATION(S): 213 SOLUTION TOPICAL at 12:13

## 2022-01-04 RX ADMIN — Medication 1 TABLET(S): at 12:13

## 2022-01-04 RX ADMIN — Medication 1 MILLIGRAM(S): at 12:12

## 2022-01-04 RX ADMIN — CEFTRIAXONE 100 MILLIGRAM(S): 500 INJECTION, POWDER, FOR SOLUTION INTRAMUSCULAR; INTRAVENOUS at 23:14

## 2022-01-04 RX ADMIN — Medication 25 GRAM(S): at 15:22

## 2022-01-04 NOTE — PROGRESS NOTE ADULT - ATTENDING COMMENTS
51 year old PMH of alcoholic cirrhosis with Esophageal varices s/p band ligation 4 months ago in [facility] and HTN  presented to the ER after having 6 vomiting episodes of dark blood. found to have COVID 19. admitted to medicine for further management.     Today the patient sated that he feels fine, no more vomiting, he had dark stool.   He stated that he doesn't drink too much " only on the weekend days" as he works as a , last drink was on Sunday     On exam General awake, alert NAD, Lungs clear to ausculation b/l, Heart regular rhythm, Abdomen: soft, non tender non distended, Ext no edema      []Acute on chronic microcytic anemia 2/2 Upper GI bleed likely esophageal varices  []Thrombocytopenia  []Decompensated Alcoholic Liver Cirrhosis:   - pt is hemodynamically stable   - Hb 7.4, Plt 54 , will give one unit   monitor Hgb, maintain type and screen active   maintaine 2 wide IV access   - repeat Hb down to 8.8  - s/p EGD with GI The varices were small and no stigmata of bleeding, here was evidence of blood in the stomach which was adequately suctioned to better visualize the stomach. Despite aggressive suctioning, a large clot was obscuring the view of the fundus and could not be fully evaluated.  - Pending Repeat EGD today   - CTA reported gastroesophageal varices, no ascitics, did not demonstrate bleeding   - C/w octreotide drip, and pantoprazole IV BID   - ID input appreciated c/w ceftriaxone and rifaximin 550 mg twice daily  monitor coagulation profile daily and platelet   fu with GI     COVID-19 infection  asymptomatic now   - Patient on RA   - ID on board     []History of alcohol use:   last drink on Sunday   CIWA but avoid ativan   folic acid, thiamin, multivitamin   fall, seizure precautions     []DVT ppx only SCD for now     Hand off:   follow GI

## 2022-01-04 NOTE — PROGRESS NOTE ADULT - SUBJECTIVE AND OBJECTIVE BOX
************************************************  Chris Tinoco MD (PGY-1)  Spectra: [phone]  ************************************************    SUBJECTIVE / OVERNIGHT EVENTS  Patient slept well overnight. No acute complaints this AM. Patient does not report fevers, chills, CP, SOB, or n/v/d    MEDICATIONS    (Floor[place])   1 Each &lt;See Task&gt; (01-03-22 @ 13:47)    cefTRIAXone   IVPB   100 mL/Hr IV Intermittent (01-03-22 @ 22:25)    chlorhexidine 4% Liquid   1 Application(s) Topical (01-04-22 @ 12:13)    erythromycin   IVPB   250 mL/Hr IV Intermittent (01-04-22 @ 06:04)   250 mL/Hr IV Intermittent (01-03-22 @ 20:43)    folic acid   1 milliGRAM(s) Oral (01-04-22 @ 12:12)    metoclopramide Injectable   5 milliGRAM(s) IV Push (01-04-22 @ 09:30)    multivitamin   1 Tablet(s) Oral (01-04-22 @ 12:13)    pantoprazole Infusion   10 mL/Hr IV Continuous (01-03-22 @ 20:44)    rifAXIMin   550 milliGRAM(s) Oral (01-04-22 @ 06:12)   550 milliGRAM(s) Oral (01-03-22 @ 22:25)    thiamine   100 milliGRAM(s) Oral (01-04-22 @ 12:12)      VITALS /  EXAM    T(C): 36.3 (01-04-22 @ 11:12), Max: 37.2 (01-04-22 @ 08:49)  HR: 74 (01-04-22 @ 11:12) (74 - 100)  BP: 127/63 (01-04-22 @ 11:12) (100/61 - 150/86)  RR: 20 (01-04-22 @ 11:12) (16 - 20)  SpO2: 98% (01-04-22 @ 11:12) (94% - 100%)    GENERAL: NAD, well-developed  CHEST/LUNG: Clear to auscultation bilaterally; No wheezes, rales or rhonchi  HEART: Regular rate and rhythm; No murmurs, rubs, or gallops  ABDOMEN: Soft, Nontender, Nondistended; Bowel sounds present, no masses.  EXTREMITIES:  2+ Peripheral Pulses, No clubbing, cyanosis, or edema    I's & O's     01-03-22 @ 07:01  -  01-04-22 @ 07:00  --------------------------------------------------------  IN:    IV PiggyBack: 500 mL    Octreotide: 120 mL    Pantoprazole: 120 mL    sodium chloride 0.9%: 900 mL  Total IN: 1640 mL    OUT:    Voided (mL): 950 mL  Total OUT: 950 mL    Total NET: 690 mL    LABS             7.4    5.85  )-----------( 54       ( 01-04-22 @ 04:30 )             22.0     142  |  105  |  10  -------------------------<  123   01-04-22 @ 04:30  3.9  |  23  |  0.7    Ca      7.3     01-04-22 @ 04:30  Mg     1.7     01-04-22 @ 04:30    TPro  5.8  /  Alb  2.8  /  TBili  1.4  /  DBili  x   /  AST  95  /  ALT  23  /  AlkPhos  88  /  GGT  x     01-04-22 @ 04:30    PT/INR - ( 01-04-22 @ 11:34 )   PT: 18.70 sec<H>;   INR: 1.63 ratio<H>  PTT - ( 01-04-22 @ 11:34 )  PTT:34.4 sec    MICRO / IMAGING / CARDIOLOGY  Telemetry: Reviewed     US Abdomen Limited (01.03.22 @ 20:47)  No significant ascites is seen    CT Abdomen and Pelvis w/ IV Cont (01.03.22 @ 20:32)  1. Gastroesophageal varices without evidence of jarred hemorrhage  2. Please note exam is not a triple phase CTA and therefore limited in evaluation for acute gastrojejunal hemorrhage    EGD (01.03.22 @ 09:00)  1. Grade D esophagitis in the middle and lower thirds of the esophagus compatible with nonspecific erosive esophagitis  2. Varices in the lower third of the esophagus  3. Evidence of blood in stomach, which was adequately suctioned for better visualization. Despite aggressive suctioning, large clot obscured view of the fundus and could not be fully evaluated  4. Erythema in the stomach body compatible with non-erosive gastritis  5. Normal mucosa in the first part of the duodenum and second part of the duodenum    Xray Chest 1 View-PORTABLE IMMEDIATE (01.03.22 @ 04:57)  No radiographic evidence of acute cardiopulmonary disease

## 2022-01-04 NOTE — CONSULT NOTE ADULT - ASSESSMENT
ASSESSMENT  50 yo M HTN, ETOH cirrhosis, EV  s/p band ligation 4 months ago in [facility] , hx ascites ?SBP admitted with hematemesis   ID consulted as COVID19 +    IMPRESSION  #COVID19 , not requiring supplemental O2     CT no GGOs (CTAP)    COVID-19 PCR: Detected (01-03-22 @ 03:43)  #ETOH cirrhosis with EVB    Creatinine, Serum: 0.7 (01-04-22 @ 04:30)      RECOMMENDATIONS  - cefTRIAXone   IVPB 1000 milliGRAM(s) IV Intermittent every 24 hours in the setting of GIB and cirrhosis ppx - for seven days. If discharged before seven days of intravenous antibiotic therapy,  transition to an oral  ciprofloxacin 500 mg every 12 hours to complete a total of seven days of antibiotic therapy  - Patient does not meet criteria for available COVID-19 therapeutics. No inpatient monoclonal Ab available  - Please inform ID if worsening oxygenation   - Hep panel B/C, HIV Ab/Ag screening    Please contact ID if worsening oxygenation or clinical status    If any questions, please call or send a message on [employer] Teams  Spectra [phone]  
IMPRESSION:    GI bleed sp EGD  Liver cirrhosis/ esophageal sp prior banding  Alcohol abuse  ascites  covid+      PLAN:    CNS: Avoid CNS depressant, thiamine/ Folic acid, monitor for withdrawal    HEENT:  Oral care    PULMONARY:  HOB @ 45 degrees, aspiration precaution, if pox > 94 %, no need for Decadron    CARDIOVASCULAR: IVF    GI: Protonix drips                                          Feeding feeding per GI    RENAL:  F/u  lytes.  Correct as needed. accurate I/O    INFECTIOUS DISEASE: Trend markers    HEMATOLOGICAL:  DVT prophylaxis. LE doppler, serial CBC    ENDOCRINE:  Follow up FS.  Insulin protocol if needed.    CODE STATUS: FULL CODE    SDU    
52 y/o M w/ pMHx of Decompensated (Hepatic encephalopathy on xifaxan and lactulose) liver cirrhosis (MELD-Na 13, CHILD A) d/t to etoh abuse presents w/ x1 day hx of hematemesis x3 episodes moderate volume. Pt had COVID symptoms w/ nausea yesterday and began vomiting blood at midnight. Pt denies any other symptoms    #Decompensated liver cirrhosis (Varices, HE) secondary to Etoh Abuse  MELD-Na Score - 13  Bart Score - A    #Hematemesis likely Variceal bleed  - hemodynamically stable   - H&H stable at 11  - Coags: 1.6  - Last use of Antithrombotic or no Antithrombotic: None    #Rec  - Keep NPO   - Maintain active Type and screen  - Trend H&H BID  - Please place x2 18G IVs  - Please start IV fluids (SBP >90)   - Please start pantoprazole 40 IV infusion  - Please start Octreotide 50mcg IV x1 followed by Octreotide drip at 50mcg/hr  - Please start Ceftriaxone 1g IV Daily for SBP Prophylaxis  - will plan for EGD this am  - Please correct electrolytes (Target Na 135-145, Mg 1.7-2.2, K 3.5-5)  - Please correct INR to <2.5  - Please target Hb >8  - Please avoid any NSAIDs  - Supportive care per primary team  - Hold propranolol for now  - Ascites evaluation - please obtain RUQ sono - evaluate for ascites and if +ve please obtain diagnostic tap  - Hepatic encephalopathy - c/w rifaximin and lactulose | avoid sedatives and opioids    # HCC screening:  Please f/u as outpatient for US abdomen and AFP every 6 months.    # Lifestyle modifications  Calorie intake 25-40 Kcal/kg/day  Protein intake: 1.2-1.5 gm/kg/day  Avoid smoking, alcohol, NSAIDS.    #Vaccinations:  Please f/u in clinic for being uptodate with HAV/HBV/Influenza/Pneumococcal vaccines.

## 2022-01-04 NOTE — CONSULT NOTE ADULT - SUBJECTIVE AND OBJECTIVE BOX
HALLIE AGUERO  51y, Male  Allergy: No Known Allergies      LOS  1d    CHIEF COMPLAINT:   Hematemesis (04 Jan 2022 07:09)      HPI  HPI:  51 year old man known to have:  - HTN on propranolol  - History of alcohol use since 31 years  - Cirhosis due to alcohol use   - Esophageal varices s/p band ligation 4 months ago in [facility]  - History of Ascites- tapped before     The patient presented to the ER after having 6 vomiting episodes of dark blood. Patient reports that it started at midnight with moderate amounts of blood (cup size). He also reports dark stools since last night, no hematochezia. No abdominal pain or diarrhea. The patient reports that the hematemesis was the first time in his lifetime. Patient also endorses having chills and dry cough since two days prior to presentation. Patient denies shortness of breath or chest pain. He reports dizziness especially upon standing up with no falls. The patient had endoscopy done 4 months ago that showed esophageal varices that were band ligated.     In the ER, patient is hemodynamically stable, mildly tachycardic   BP: 128/63 mmHg  HR: 96  Spo2: 98% on RA  Temp: 98.6 F       (03 Jan 2022 09:05)      INFECTIOUS DISEASE HISTORY:  Satting well on RA  CT AP has no GGOs        ROS  10-system ROS performed and negative except as per HPI      PMH  PAST MEDICAL & SURGICAL HISTORY:      FAMILY HISTORY  non-contributory     SOCIAL HISTORY  Social History:  Smoker 1/4 pack a day for 30 years. (7 pack years)  Alcohol use: 1 cup of vodka every few days (last drink on 1 day prior to admission) (03 Jan 2022 09:05)        VITALS:  T(F): 98.9, Max: 98.9 (01-04-22 @ 08:49)  HR: 84  BP: 123/68  RR: 16Vital Signs Last 24 Hrs  T(C): 37.2 (04 Jan 2022 08:49), Max: 37.2 (04 Jan 2022 08:49)  T(F): 98.9 (04 Jan 2022 08:49), Max: 98.9 (04 Jan 2022 08:49)  HR: 84 (04 Jan 2022 08:49) (76 - 100)  BP: 123/68 (04 Jan 2022 08:49) (100/61 - 150/86)  BP(mean): --  RR: 16 (04 Jan 2022 08:49) (16 - 20)  SpO2: 100% (04 Jan 2022 08:49) (94% - 100%)    Gen: on RA  HEENT: NCAT  Resp: b/l chest expansion  Neuro: nonfocal       TESTS & MEASUREMENTS:                        7.4    5.85  )-----------( 54       ( 04 Jan 2022 04:30 )             22.0     01-04    142  |  105  |  10  ----------------------------<  123<H>  3.9   |  23  |  0.7    Ca    7.3<L>      04 Jan 2022 04:30  Mg     1.7     01-04    TPro  5.8<L>  /  Alb  2.8<L>  /  TBili  1.4<H>  /  DBili  x   /  AST  95<H>  /  ALT  23  /  AlkPhos  88  01-04    eGFR if Non African American: 108 mL/min/1.73M2 (01-04-22 @ 04:30)  eGFR if African American: 126 mL/min/1.73M2 (01-04-22 @ 04:30)    LIVER FUNCTIONS - ( 04 Jan 2022 04:30 )  Alb: 2.8 g/dL / Pro: 5.8 g/dL / ALK PHOS: 88 U/L / ALT: 23 U/L / AST: 95 U/L / GGT: x                                   INFECTIOUS DISEASES TESTING  COVID-19 PCR: Detected (01-03-22 @ 03:43)      INFLAMMATORY MARKERS      RADIOLOGY & ADDITIONAL TESTS:  I have personally reviewed the last Chest xray  CXR      CT  CT Abdomen and Pelvis w/ IV Cont:   ACC: [id number] EXAM:  CT ABDOMEN AND PELVIS IC                          PROCEDURE DATE:  01/03/2022          INTERPRETATION:  CLINICAL HISTORY / REASON FOR EXAM: Hemorrhage; possible   gastric variceal bleed.    TECHNIQUE: Contiguous axial CT imageswere obtained from the lower chest   to the pubic symphysis following administration of 100 mL Optiray 320   intravenous contrast. Oral contrast was not administered. Reformatted   images in the coronal and sagittal planes were acquired.    COMPARISON CT: None    OTHER STUDIES USED FOR CORRELATION: None.      FINDINGS:    LOWER CHEST: Trace areas of inferior right upper lobe emphysematous   changes. Bibasilar atelectasis.    HEPATOBILIARY: Unremarkable.    SPLEEN: Unremarkable.    PANCREAS: Unremarkable.    ADRENAL GLANDS: Unremarkable.    KIDNEYS: Symmetric enhancement bilaterally. No evidence of   hydronephrosis. Renal cysts and other subcentimeter hypodensities, too   small to further characterize.    ABDOMINOPELVIC NODES: Unremarkable.    PELVIC ORGANS: Unremarkable.    PERITONEUM/MESENTERY/BOWEL: Hiatal hernia. Sigmoid diverticulosis. No   bowel obstruction, ascites or intraperitoneal free air. Normal caliber   appendix.    BONES/SOFT TISSUES: Degenerative changes to the thoracolumbar spine. Soft   tissue densities within the bilateral inguinal canals, likely undescended   testes.    VASCULAR: Gastroesophageal varices. Trace atherosclerotic calcifications   within the infrarenal abdominal aorta.      IMPRESSION:    Gastroesophageal varices without evidence of jarred hemorrhage. Please   note exam is not a triple phase CTA and therefore limited in evaluation   for acute gastrojejunal hemorrhage.    --- End of Report ---            BRIANDA NUNEZ MD; Attending Radiologist  This document has been electronically signed. Alec  3 2022  8:40PM (01-03-22 @ 20:32)      CARDIOLOGY TESTING  12 Lead ECG:   Ventricular Rate 90 BPM    Atrial Rate 90 BPM    P-R Interval 156 ms    QRS Duration 82 ms    Q-T Interval 416 ms    QTC Calculation(Bazett) 508 ms    P Axis 71 degrees    R Axis 68 degrees    T Axis 81 degrees    Diagnosis Line Sinus rhythm with frequent Premature ventricular complexes and Premature  atrial complexes  Prolonged QT  Abnormal ECG    Confirmed by YFN ACOSTA MD ([id number]) on 1/3/2022 8:22:36 AM (01-03-22 @ 03:48)      MEDICATIONS  cefTRIAXone   IVPB 1000 IV Intermittent every 24 hours  chlorhexidine 4% Liquid 1 Topical daily  folic acid 1 Oral daily  lactated ringers. 1000 IV Continuous <Continuous>  lactulose Syrup 10 Oral daily  metoclopramide Injectable 5 IV Push two times a day  multivitamin 1 Oral daily  octreotide  Infusion 50 IV Continuous <Continuous>  pantoprazole Infusion 8 IV Continuous <Continuous>  rifAXIMin 550 Oral two times a day  thiamine 100 Oral daily      Weight  Weight (kg): 72.6 (01-03-22 @ 13:57)    ANTIBIOTICS:  cefTRIAXone   IVPB 1000 milliGRAM(s) IV Intermittent every 24 hours  rifAXIMin 550 milliGRAM(s) Oral two times a day      ALLERGIES:  No Known Allergies

## 2022-01-04 NOTE — CHART NOTE - NSCHARTNOTEFT_GEN_A_CORE
PACU ANESTHESIA ADMISSION NOTE      Procedure:   Post op diagnosis:      ____  Intubated  TV:______       Rate: ______      FiO2: ______    __x__  Patent Airway    __x__  Full return of protective reflexes    __x__  Full recovery from anesthesia / back to baseline     Vitals:   T: 97          R:  12                BP: 103/55                 Sat:  98                 P: 94      Mental Status:  _x___ Awake   __x___ Alert   _____ Drowsy   _____ Sedated    Nausea/Vomiting:  _x___ NO  ______Yes,   __x__ See Post - Op Orders          Pain Scale (0-10):  __0___    Treatment: ____ None    __x__ See Post - Op/PCA Orders    Post - Operative Fluids:   ____ Oral   __x__ See Post - Op Orders    Plan: Discharge:   ___Home       ___x__Floor     _____Critical Care    _____  Other:_________________    Comments: When parameters met.

## 2022-01-04 NOTE — CONSULT NOTE ADULT - SUBJECTIVE AND OBJECTIVE BOX
Patient is a 51y old  Male who presents with a chief complaint of Hematemesis (03 Jan 2022 09:05)      HPI:  51 year old man known to have, HTN on propranolol,  History of alcohol use since 32 years, liver Cirrhosis due to alcohol use , Esophageal varices s/p band ligation 4 months ago in [facility]  History of Ascites- tapped before presented to the ER after having 6 vomiting episodes of dark blood. Patient reports that it started at midnight with moderate amounts of blood (cup size). He also reports dark stools since last night, no hematochezia. No abdominal pain or diarrhea. The patient reports that the hematemesis was the first time in his lifetime. Patient also endorses having chills and dry cough since two days prior to presentation. Patient denies shortness of breath or chest pain. He reports dizziness especially upon standing up with no falls. The patient had endoscopy done 4 months ago that showed esophageal varices that were band ligated.     In the ER, patient is hemodynamically stable, mildly tachycardic   BP: 128/63 mmHg  HR: 96  Spo2: 98% on RA  Temp: 98.6 F    seen by GI, sp EGD, COVID +, admitted to SDU      PAST MEDICAL & SURGICAL HISTORY:      SOCIAL HX:   Smoking  +, Alcohol +        REVIEW OF SYSTEMS see hpi      Allergies    No Known Allergies    Intolerances        cefTRIAXone   IVPB 1000 milliGRAM(s) IV Intermittent every 24 hours  chlorhexidine 4% Liquid 1 Application(s) Topical daily  folic acid 1 milliGRAM(s) Oral daily  lactated ringers. 1000 milliLiter(s) IV Continuous <Continuous>  lactulose Syrup 10 Gram(s) Oral daily  octreotide  Infusion 50 MICROgram(s)/Hr IV Continuous <Continuous>  pantoprazole Infusion 8 mG/Hr IV Continuous <Continuous>  rifAXIMin 550 milliGRAM(s) Oral two times a day  sodium chloride 0.9%. 1000 milliLiter(s) IV Continuous <Continuous>  thiamine 100 milliGRAM(s) Oral daily  : Home Meds:      PHYSICAL EXAM    ICU Vital Signs Last 24 Hrs  T(C): 36.7 (04 Jan 2022 04:00), Max: 36.7 (03 Jan 2022 09:45)  T(F): 98 (04 Jan 2022 04:00), Max: 98 (03 Jan 2022 09:45)  HR: 76 (04 Jan 2022 04:00) (76 - 100)  BP: 135/61 (04 Jan 2022 04:00) (100/61 - 150/86)  RR: 16 (04 Jan 2022 04:00) (16 - 20)  SpO2: 97% (04 Jan 2022 04:00) (94% - 100%)      General: ill looking  HEENT:  KARLA              Lymph Nodes: No cervical LN   Lungs: Bilateral BS  Cardiovascular: Regular  Abdomen: Soft, Positive BS  Extremities: No clubbing  Neurological: Non focal         LABS:                          8.8    6.05  )-----------( 45       ( 03 Jan 2022 11:00 )             26.2                                               01-03    140  |  96<L>  |  13  ----------------------------<  184<H>  3.8   |  23  |  0.7    Ca    8.4<L>      03 Jan 2022 04:00    TPro  7.6  /  Alb  3.6  /  TBili  1.6<H>  /  DBili  x   /  AST  140<H>  /  ALT  31  /  AlkPhos  138<H>  01-03      PT/INR - ( 03 Jan 2022 04:00 )   PT: 18.80 sec;   INR: 1.64 ratio         PTT - ( 03 Jan 2022 04:00 )  PTT:34.9 sec                                                                                     LIVER FUNCTIONS - ( 03 Jan 2022 04:00 )  Alb: 3.6 g/dL / Pro: 7.6 g/dL / ALK PHOS: 138 U/L / ALT: 31 U/L / AST: 140 U/L / GGT: x                                                                                                                                           MEDICATIONS  (STANDING):  cefTRIAXone   IVPB 1000 milliGRAM(s) IV Intermittent every 24 hours  chlorhexidine 4% Liquid 1 Application(s) Topical daily  folic acid 1 milliGRAM(s) Oral daily  lactated ringers. 1000 milliLiter(s) (100 mL/Hr) IV Continuous <Continuous>  lactulose Syrup 10 Gram(s) Oral daily  octreotide  Infusion 50 MICROgram(s)/Hr (10 mL/Hr) IV Continuous <Continuous>  pantoprazole Infusion 8 mG/Hr (10 mL/Hr) IV Continuous <Continuous>  rifAXIMin 550 milliGRAM(s) Oral two times a day  sodium chloride 0.9%. 1000 milliLiter(s) (75 mL/Hr) IV Continuous <Continuous>  thiamine 100 milliGRAM(s) Oral daily    CXR reviewed

## 2022-01-04 NOTE — PRE-ANESTHESIA EVALUATION ADULT - NSANTHOSAYNRD_GEN_A_CORE
No. ETHAN screening performed.  STOP BANG Legend: 0-2 = LOW Risk; 3-4 = INTERMEDIATE Risk; 5-8 = HIGH Risk
No. ETHAN screening performed.  STOP BANG Legend: 0-2 = LOW Risk; 3-4 = INTERMEDIATE Risk; 5-8 = HIGH Risk

## 2022-01-04 NOTE — PROGRESS NOTE ADULT - ASSESSMENT
51M w/ h/o EtOH use disorder, alcoholic liver cirrhosis, and esophageal varices (s/p band ligation 4 months ago at [facility]) p/w hematemesis and melena.    #Upper GI Bleed  #Esophageal Varices, h/o  #Alcoholic Liver Cirrhosis  Initially presenting s/p 6 episodes of hematemesis a/w melena. EGD at [facility] 4 months ago showed esophageal varices that were band ligated. Underwent EGD 1/3 which demonstrated evidence of blood in stomach, but no active bleeding site seen. EGD limited by clot in stomach. CTAP w/ IV contrast also demonstrated no evidence of active GI bleed. No ascites visualized on US abdomen. MELD score 14 which estimates 3-month mortality at 6%. CHILD class A  - Repeat EGD today  - c/w octreotide drip at 50 mcg/hr  - c/w pantoprazole gtt @ 8 mg/hr  - c/w ceftriaxone 1000 mg daily for 7 days (SBP ppx)  - c/w rifaximin 550 mg twice daily  - c/w lactulose (titrate to 2-3 BM per day)  - continue to hold home Lasix and propanolol  - Daily PT/INR (Goal: INR <2.5)  - Trend Hb on CBC BID; transfuse per GI (likely is Hb<8)  - Maintain active T&S  - f/u TTE (ordered to assess baseline EF)    #Asymptomatic COVID-19 Infection  Incidentally found to have COVID-19 on admission screen. Currently saturating well on RA. No reported CP or SOB. Admission CXR clear. No GGO's noted in portion of lung fields visualized in CTAP. Per ID, pt does not meet criteria for available COVID-19 therapeutics.   - Reconsult ID if oxygen requirements change    #Alcohol Use Disorder  #Thiamine Deficiency, suspected  #Folate Deficiency, suspected  #Vitamin deficiency, suspected  H/o alcohol use disorder leading to alcoholic liver cirrhosis Last drink was one day prior to admission.  - Monitor for alcohol withdrawal  - c/w thiamine 100mg PO QD  - c/w folic acid 1mg PO QD  - c/w multivitamin 1 tab PO QD    DVT PPX: heparin 5000U SQ Q8H  GI PPX: pantoprazole gtt  DIET: NPO for EGD  ACTIVITY: IAT  CODE STATUS: Full Code  DISPOSITION: From Home    PENDINu pRBC, EGD

## 2022-01-05 LAB
ALBUMIN SERPL ELPH-MCNC: 3.2 G/DL — LOW (ref 3.5–5.2)
ALP SERPL-CCNC: 96 U/L — SIGNIFICANT CHANGE UP (ref 30–115)
ALT FLD-CCNC: 32 U/L — SIGNIFICANT CHANGE UP (ref 0–41)
ANION GAP SERPL CALC-SCNC: 15 MMOL/L — HIGH (ref 7–14)
AST SERPL-CCNC: 118 U/L — HIGH (ref 0–41)
BASOPHILS # BLD AUTO: 0.03 K/UL — SIGNIFICANT CHANGE UP (ref 0–0.2)
BASOPHILS NFR BLD AUTO: 0.4 % — SIGNIFICANT CHANGE UP (ref 0–1)
BILIRUB SERPL-MCNC: 1.9 MG/DL — HIGH (ref 0.2–1.2)
BUN SERPL-MCNC: 7 MG/DL — LOW (ref 10–20)
CALCIUM SERPL-MCNC: 7.5 MG/DL — LOW (ref 8.5–10.1)
CHLORIDE SERPL-SCNC: 98 MMOL/L — SIGNIFICANT CHANGE UP (ref 98–110)
CO2 SERPL-SCNC: 21 MMOL/L — SIGNIFICANT CHANGE UP (ref 17–32)
CREAT SERPL-MCNC: 0.7 MG/DL — SIGNIFICANT CHANGE UP (ref 0.7–1.5)
EOSINOPHIL # BLD AUTO: 0.14 K/UL — SIGNIFICANT CHANGE UP (ref 0–0.7)
EOSINOPHIL NFR BLD AUTO: 1.9 % — SIGNIFICANT CHANGE UP (ref 0–8)
GLUCOSE SERPL-MCNC: 112 MG/DL — HIGH (ref 70–99)
HAV IGM SER-ACNC: SIGNIFICANT CHANGE UP
HBV CORE IGM SER-ACNC: SIGNIFICANT CHANGE UP
HBV SURFACE AG SER-ACNC: SIGNIFICANT CHANGE UP
HCT VFR BLD CALC: 26.6 % — LOW (ref 42–52)
HCV AB S/CO SERPL IA: 0.23 S/CO — SIGNIFICANT CHANGE UP (ref 0–0.99)
HCV AB SERPL-IMP: SIGNIFICANT CHANGE UP
HGB BLD-MCNC: 9 G/DL — LOW (ref 14–18)
HIV 1+2 AB+HIV1 P24 AG SERPL QL IA: SIGNIFICANT CHANGE UP
IMM GRANULOCYTES NFR BLD AUTO: 0.3 % — SIGNIFICANT CHANGE UP (ref 0.1–0.3)
INR BLD: 1.48 RATIO — HIGH (ref 0.65–1.3)
LYMPHOCYTES # BLD AUTO: 2.76 K/UL — SIGNIFICANT CHANGE UP (ref 1.2–3.4)
LYMPHOCYTES # BLD AUTO: 37.8 % — SIGNIFICANT CHANGE UP (ref 20.5–51.1)
MAGNESIUM SERPL-MCNC: 1.7 MG/DL — LOW (ref 1.8–2.4)
MCHC RBC-ENTMCNC: 25.3 PG — LOW (ref 27–31)
MCHC RBC-ENTMCNC: 33.8 G/DL — SIGNIFICANT CHANGE UP (ref 32–37)
MCV RBC AUTO: 74.7 FL — LOW (ref 80–94)
MONOCYTES # BLD AUTO: 0.69 K/UL — HIGH (ref 0.1–0.6)
MONOCYTES NFR BLD AUTO: 9.4 % — HIGH (ref 1.7–9.3)
NEUTROPHILS # BLD AUTO: 3.67 K/UL — SIGNIFICANT CHANGE UP (ref 1.4–6.5)
NEUTROPHILS NFR BLD AUTO: 50.2 % — SIGNIFICANT CHANGE UP (ref 42.2–75.2)
NRBC # BLD: 0 /100 WBCS — SIGNIFICANT CHANGE UP (ref 0–0)
PLATELET # BLD AUTO: 50 K/UL — LOW (ref 130–400)
POTASSIUM SERPL-MCNC: 3.2 MMOL/L — LOW (ref 3.5–5)
POTASSIUM SERPL-SCNC: 3.2 MMOL/L — LOW (ref 3.5–5)
PROT SERPL-MCNC: 6.4 G/DL — SIGNIFICANT CHANGE UP (ref 6–8)
PROTHROM AB SERPL-ACNC: 17 SEC — HIGH (ref 9.95–12.87)
RBC # BLD: 3.56 M/UL — LOW (ref 4.7–6.1)
RBC # FLD: 15.6 % — HIGH (ref 11.5–14.5)
SODIUM SERPL-SCNC: 134 MMOL/L — LOW (ref 135–146)
SURGICAL PATHOLOGY STUDY: SIGNIFICANT CHANGE UP
WBC # BLD: 7.31 K/UL — SIGNIFICANT CHANGE UP (ref 4.8–10.8)
WBC # FLD AUTO: 7.31 K/UL — SIGNIFICANT CHANGE UP (ref 4.8–10.8)

## 2022-01-05 PROCEDURE — 99233 SBSQ HOSP IP/OBS HIGH 50: CPT

## 2022-01-05 RX ORDER — OMEPRAZOLE 10 MG/1
1 CAPSULE, DELAYED RELEASE ORAL
Qty: 0 | Refills: 0 | DISCHARGE

## 2022-01-05 RX ORDER — POTASSIUM CHLORIDE 20 MEQ
40 PACKET (EA) ORAL ONCE
Refills: 0 | Status: COMPLETED | OUTPATIENT
Start: 2022-01-05 | End: 2022-01-05

## 2022-01-05 RX ORDER — MAGNESIUM SULFATE 500 MG/ML
2 VIAL (ML) INJECTION ONCE
Refills: 0 | Status: COMPLETED | OUTPATIENT
Start: 2022-01-05 | End: 2022-01-05

## 2022-01-05 RX ORDER — THIAMINE MONONITRATE (VIT B1) 100 MG
1 TABLET ORAL
Qty: 0 | Refills: 0 | DISCHARGE
Start: 2022-01-05

## 2022-01-05 RX ORDER — PANTOPRAZOLE SODIUM 20 MG/1
1 TABLET, DELAYED RELEASE ORAL
Qty: 0 | Refills: 0 | DISCHARGE
Start: 2022-01-05

## 2022-01-05 RX ADMIN — Medication 1 MILLIGRAM(S): at 12:18

## 2022-01-05 RX ADMIN — Medication 1 TABLET(S): at 12:18

## 2022-01-05 RX ADMIN — PANTOPRAZOLE SODIUM 40 MILLIGRAM(S): 20 TABLET, DELAYED RELEASE ORAL at 17:53

## 2022-01-05 RX ADMIN — PANTOPRAZOLE SODIUM 40 MILLIGRAM(S): 20 TABLET, DELAYED RELEASE ORAL at 05:35

## 2022-01-05 RX ADMIN — Medication 40 MILLIEQUIVALENT(S): at 10:00

## 2022-01-05 RX ADMIN — CHLORHEXIDINE GLUCONATE 1 APPLICATION(S): 213 SOLUTION TOPICAL at 12:15

## 2022-01-05 RX ADMIN — Medication 25 GRAM(S): at 10:00

## 2022-01-05 RX ADMIN — Medication 100 MILLIGRAM(S): at 12:18

## 2022-01-05 RX ADMIN — CEFTRIAXONE 100 MILLIGRAM(S): 500 INJECTION, POWDER, FOR SOLUTION INTRAMUSCULAR; INTRAVENOUS at 23:49

## 2022-01-05 NOTE — DISCHARGE NOTE PROVIDER - NSDCCPCAREPLAN_GEN_ALL_CORE_FT
PRINCIPAL DISCHARGE DIAGNOSIS  Diagnosis: Rosaura-Murphy tear  Assessment and Plan of Treatment: You came to the hospital after vomiting blood       PRINCIPAL DISCHARGE DIAGNOSIS  Diagnosis: Rosaura-Murphy tear  Assessment and Plan of Treatment: You came to the hospital after vomiting blood. You have a tear in your esophagus, continue taking protonix twice a day      SECONDARY DISCHARGE DIAGNOSES  Diagnosis: Liver cirrhosis  Assessment and Plan of Treatment: Cirrhosis is long-term scarring of the liver. The liver makes enzymes and bile that help digest food and gives your body energy. It also removes harmful material from your body, such as alcohol and other chemicals. Cirrhosis is caused by repeated damage to your liver over time. Scar tissue starts to replace healthy liver tissue. The scar tissue prevents the liver from working properly.  Seek care immediately if:  You have pain during a bowel movement and it is black or contains blood.  You have a fast heart rate and fast breathing.  You are dizzy or confused.  You have severe pain in your abdomen.  You have trouble breathing.  Your vomit looks like it has coffee grinds or blood in it.  Contact your healthcare provider if:  You have a fever.  You have red or itchy skin.  You are in pain and feel weak.  You have questions or concerns about your condition or care.  Medicines:  You may need any of the following:  Antiviral medicine may be needed if your cirrhosis is caused by hepatitis. Antiviral medicine may prevent or decrease swelling and damage to your liver.  Blood pressure medicine is used to treat high blood pressure in the portal vein (the vein that goes to your liver).  Diuretics decrease extra fluid that collects in a part of your body, such as your legs and abdomen. Diuretics can also decrease your blood pressure. You will urinate more often when you take this medicine.  Antibiotics help prevent or treat a bacterial infection.  Take your medicine as directed.   Do not drink alcohol:  Alcohol will cause more damage to your liver.  Limit sodium (salt)  Drink liquids as directed. Ask how much liquid to drink each day and which liquids are best for you.  Ask about vaccines. You may have a hard time fighting infection because of cirrhosis.   Some medicines can harm your liver.    Diagnosis: 2019 novel coronavirus disease (COVID-19)  Assessment and Plan of Treatment: Coronavirus disease 2019 (COVID-19) is a respiratory illness  that can spread from person to person. The virus that causes  COVID-19 is a novel coronavirus that was first identified during  an investigation into an outbreak in [place], [place].  The virus that causes COVID-19 probably emerged from an  animal source, but is now spreading from person to person.  The virus is thought to spread mainly between people who  are in close contact with one another (within about 6 feet)  through respiratory droplets produced when an infected  person coughs or sneezes. It also may be possible that a person  can get COVID-19 by touching a surface or object that has  the virus on it and then touching their own mouth, nose, or  possibly their eyes, but this is not thought to be the main  way the virus spreads.  Please stay home and avoid contact with others for at least a week after symptoms resolve and follow government guidelines.   Patients with COVID-19 have had mild to severe respiratory  illness with symptoms of  • fever  • cough  • shortness of breath  People can help protect themselves from respiratory illness with  everyday preventive actions.    • Avoid close contact with people who are sick.  • Avoid touching your eyes, nose, and mouth with  unwashed hands.  • Wash your hands often with soap and water for at least 20   seconds. Use an alcohol-based hand  that contains at  least 60% alcohol if soap and water are not available.   Stay home when you are sick.  • Cover your cough or sneeze with a tissue, then throw the  tissue in the trash.  • Clean and disinfect frequently touched objects  and surfaces.  Call 911 and inform them you are covid positive before you decide to go to the emergency room if you have chest pain, difficulty breathing, high fevers, worsening of your symptoms, feel unwell, or have nausea and vomiting.    Diagnosis: Alcohol abuse  Assessment and Plan of Treatment: Alcohol dependence , also known as alcoholism, is a type of alcohol use disorder. With alcohol dependence, you drink alcohol too much and too often for a long period of time. You have a continuous craving for alcohol, making it hard to do your usual day-to-day activities.   Alcohol withdrawal occurs when you stop drinking, or you drink less while having alcohol dependence. Symptoms begin as your body tries to get used to this change. Common symptoms include shaking, throwing up, and sweating. These symptoms may make you anxious and unable to work or be around others. Symptoms occur within several hours to a few days after stopping alcohol, and are not caused by other health problems.  Treatment for alcohol dependence and withdrawal includes medicines, detoxification, and therapy. Diagnosing and treating alcohol dependence and withdrawal as soon as possible may relieve or prevent symptoms. With treatment and care, your alcohol dependence and withdrawal may be controlled, and your quality of life improved.  AFTER YOU LEAVE:  Take your medicine as directed:  Self-care:  Avoid drinking alcohol  Be patient and keep your hopes up for improvement.   Learn about new treatments that may help.   Learn more about alcohol dependence and withdrawal. .  Avoid stress:   If you drink alcohol again, do not drive or operate machines. Ask someone who is sober to help you go home or bring you to the nearest hospital.  Alcoholics Anonymous  Web Address: http://www.alcoholics-anonymous.org.  National Clearinghouse on Drug and Alcohol Information  Phone: 7- 708 - 3068283  Web Address: www.[employer].org  CONTACT A CAREGIVER IF:  You cannot make it to your next meeting with your caregiver.  You feel you cannot cope at home, work, or in school.  You have new symptoms since the last time you visited your caregiver.  Your symptoms are getting worse.  You have questions or concerns about your alcohol dependence or withdrawal, medicine, or care.  SEEK CARE IMMEDIATELY IF:  You just had a convulsion.  You have trouble chanell

## 2022-01-05 NOTE — PROGRESS NOTE ADULT - SUBJECTIVE AND OBJECTIVE BOX
************************************************  Chris Tinoco MD (PGY-1)  Spectra: [phone]  ************************************************    SUBJECTIVE / OVERNIGHT EVENTS  Patient slept well overnight. No acute complaints this AM. Patient does not report fevers, chills, CP, SOB, or n/v/d    MEDICATIONS    (ADM OVERRIDE)   1 Each &lt;See Task&gt; (01-04-22 @ 14:06)    cefTRIAXone   IVPB   100 mL/Hr IV Intermittent (01-04-22 @ 23:14)    chlorhexidine 4% Liquid   1 Application(s) Topical (01-04-22 @ 12:13)    folic acid   1 milliGRAM(s) Oral (01-04-22 @ 12:12)    magnesium sulfate  IVPB   25 mL/Hr IV Intermittent (01-04-22 @ 15:22)    metoclopramide Injectable   5 milliGRAM(s) IV Push (01-04-22 @ 15:23)   5 milliGRAM(s) IV Push (01-04-22 @ 09:30)    multivitamin   1 Tablet(s) Oral (01-04-22 @ 12:13)    pantoprazole    Tablet   40 milliGRAM(s) Oral (01-05-22 @ 05:35)    rifAXIMin   550 milliGRAM(s) Oral (01-05-22 @ 05:35)   550 milliGRAM(s) Oral (01-04-22 @ 15:22)    thiamine   100 milliGRAM(s) Oral (01-04-22 @ 12:12)      VITALS /  EXAM    T(C): 36.8 (01-05-22 @ 08:11), Max: 37.2 (01-04-22 @ 08:49)  HR: 86 (01-05-22 @ 08:11) (68 - 86)  BP: 113/71 (01-05-22 @ 08:11) (113/71 - 155/84)  RR: 18 (01-05-22 @ 08:11) (16 - 21)  SpO2: 100% (01-05-22 @ 08:11) (98% - 100%)    GENERAL: NAD, well-developed  CHEST/LUNG: Clear to auscultation bilaterally; No wheezes, rales or rhonchi  HEART: Regular rate and rhythm; No murmurs, rubs, or gallops  ABDOMEN: Soft, Nontender, Nondistended; Bowel sounds present, no masses.  EXTREMITIES:  2+ Peripheral Pulses, No clubbing, cyanosis, or edema    I's & O's     01-04-22 @ 07:01  -  01-05-22 @ 07:00  --------------------------------------------------------  IN:    Oral Fluid: 300 mL  Total IN: 300 mL    OUT:  Total OUT: 0 mL    Total NET: 300 mL        LABS             9.0    7.31  )-----------( 50       ( 01-05-22 @ 04:30 )             26.6     142  |  105  |  10  -------------------------<  123   01-04-22 @ 04:30  3.9  |  23  |  0.7    Ca      7.3     01-04-22 @ 04:30  Mg     1.7     01-04-22 @ 04:30    TPro  5.8  /  Alb  2.8  /  TBili  1.4  /  DBili  x   /  AST  95  /  ALT  23  /  AlkPhos  88  /  GGT  x     01-04-22 @ 04:30    PT/INR - ( 01-05-22 @ 04:30 )   PT: 17.00 sec<H>;   INR: 1.48 ratio<H>  PTT - ( 01-04-22 @ 11:34 )  PTT:34.4 sec    MICRO / IMAGING / CARDIOLOGY  Telemetry: Reviewed     EGD (01.04.22 @ 10:30)  1. Varices in the lower third of the esophagus, flattened with air insufflation  2. Hiatal Hernia  3. Erythema in the stomach compatible with non-erosive gastritis (s/p biopsy)  4. A 5 cm erythematous linear erosion noted in gastric cardia near GE junction c/w Rosaura Murphy tear, which is now healing w/o evidence of active bleeding (likely reason for UGIB)  5. Normal mucosa in the first part of the duodenum and second part of the duodenum ************************************************  Chris Tinoco MD (PGY-1)  Spectra: [phone]  ************************************************    SUBJECTIVE / OVERNIGHT EVENTS  Patient slept well overnight. No acute complaints this AM. Patient does not report fevers, chills, CP, SOB, or n/v/d    MEDICATIONS    cefTRIAXone   IVPB   100 mL/Hr IV Intermittent (01-04-22 @ 23:14)    chlorhexidine 4% Liquid   1 Application(s) Topical (01-04-22 @ 12:13)    folic acid   1 milliGRAM(s) Oral (01-04-22 @ 12:12)    magnesium sulfate  IVPB   25 mL/Hr IV Intermittent (01-04-22 @ 15:22)    metoclopramide Injectable   5 milliGRAM(s) IV Push (01-04-22 @ 15:23)   5 milliGRAM(s) IV Push (01-04-22 @ 09:30)    multivitamin   1 Tablet(s) Oral (01-04-22 @ 12:13)    pantoprazole    Tablet   40 milliGRAM(s) Oral (01-05-22 @ 05:35)    rifAXIMin   550 milliGRAM(s) Oral (01-05-22 @ 05:35)   550 milliGRAM(s) Oral (01-04-22 @ 15:22)    thiamine   100 milliGRAM(s) Oral (01-04-22 @ 12:12)      VITALS /  EXAM    T(C): 36.8 (01-05-22 @ 08:11), Max: 37.2 (01-04-22 @ 08:49)  HR: 86 (01-05-22 @ 08:11) (68 - 86)  BP: 113/71 (01-05-22 @ 08:11) (113/71 - 155/84)  RR: 18 (01-05-22 @ 08:11) (16 - 21)  SpO2: 100% (01-05-22 @ 08:11) (98% - 100%)    GENERAL: NAD, well-developed  CHEST/LUNG: Clear to auscultation bilaterally; No wheezes, rales or rhonchi  HEART: Regular rate and rhythm; No murmurs, rubs, or gallops  ABDOMEN: Soft, Nontender, Nondistended; Bowel sounds present, no masses.  EXTREMITIES:  2+ Peripheral Pulses, No clubbing, cyanosis, or edema    I's & O's     01-04-22 @ 07:01  -  01-05-22 @ 07:00  --------------------------------------------------------  IN:    Oral Fluid: 300 mL  Total IN: 300 mL    OUT:  Total OUT: 0 mL    Total NET: 300 mL        LABS             9.0    7.31  )-----------( 50       ( 01-05-22 @ 04:30 )             26.6     142  |  105  |  10  -------------------------<  123   01-04-22 @ 04:30  3.9  |  23  |  0.7    Ca      7.3     01-04-22 @ 04:30  Mg     1.7     01-04-22 @ 04:30    TPro  5.8  /  Alb  2.8  /  TBili  1.4  /  DBili  x   /  AST  95  /  ALT  23  /  AlkPhos  88  /  GGT  x     01-04-22 @ 04:30    PT/INR - ( 01-05-22 @ 04:30 )   PT: 17.00 sec<H>;   INR: 1.48 ratio<H>  PTT - ( 01-04-22 @ 11:34 )  PTT:34.4 sec    MICRO / IMAGING / CARDIOLOGY  Telemetry: Reviewed     EGD (01.04.22 @ 10:30)  1. Varices in the lower third of the esophagus, flattened with air insufflation  2. Hiatal Hernia  3. Erythema in the stomach compatible with non-erosive gastritis (s/p biopsy)  4. A 5 cm erythematous linear erosion noted in gastric cardia near GE junction c/w Rosaura Murphy tear, which is now healing w/o evidence of active bleeding (likely reason for UGIB)  5. Normal mucosa in the first part of the duodenum and second part of the duodenum Patent

## 2022-01-05 NOTE — DISCHARGE NOTE PROVIDER - NSDCMRMEDTOKEN_GEN_ALL_CORE_FT
folic acid 1 mg oral tablet: 1 tab(s) orally once a day  lactulose 10 g/15 mL oral syrup: 15 milliliter(s) orally once a day  Lasix 40 mg oral tablet: 1 tab(s) orally once a day  Multiple Vitamins oral tablet: 1 tab(s) orally once a day  pantoprazole 40 mg oral delayed release tablet: 1 tab(s) orally 2 times a day  propranolol 10 mg oral tablet: orally 3 times a day  rifAXIMin 550 mg oral tablet: 1 tab(s) orally 2 times a day  thiamine 100 mg oral tablet: 1 tab(s) orally once a day   ciprofloxacin 500 mg oral tablet, extended release: 1 tab(s) orally 2 times a day   folic acid 1 mg oral tablet: 1 tab(s) orally once a day  lactulose 10 g/15 mL oral syrup: 15 milliliter(s) orally once a day  Lasix 40 mg oral tablet: 1 tab(s) orally once a day  Multiple Vitamins oral tablet: 1 tab(s) orally once a day  pantoprazole 40 mg oral delayed release tablet: 1 tab(s) orally 2 times a day  propranolol 10 mg oral tablet: orally 3 times a day  rifAXIMin 550 mg oral tablet: 1 tab(s) orally 2 times a day  thiamine 100 mg oral tablet: 1 tab(s) orally once a day

## 2022-01-05 NOTE — PROGRESS NOTE ADULT - SUBJECTIVE AND OBJECTIVE BOX
HALLIE AGUERO  51y  Male      Patient is a 51y old  Male who presents with =Hematemesis (05 Jan 2022 08:23)      INTERVAL HPI/OVERNIGHT EVENTS:  Patient seen and examined earlier this morning  Lying comfortably in bed  In NAD  denies any complaints      REVIEW OF SYSTEMS:  CONSTITUTIONAL: No fever, weight loss, or fatigue  EYES: No eye pain, visual disturbances, or discharge  ENMT:  No difficulty hearing, tinnitus, vertigo; No sinus or throat pain  NECK: No pain or stiffness  RESPIRATORY: No cough, wheezing, chills or hemoptysis; No shortness of breath  CARDIOVASCULAR: No chest pain, palpitations, dizziness, or leg swelling  GASTROINTESTINAL: No abdominal or epigastric pain. No nausea, vomiting, or hematemesis; No diarrhea or constipation. No melena or hematochezia.  GENITOURINARY: No dysuria, frequency, hematuria, or incontinence  NEUROLOGICAL: No headaches, memory loss, loss of strength, numbness, or tremors  SKIN: No itching, burning, rashes, or lesions   LYMPH NODES: No enlarged glands  ENDOCRINE: No heat or cold intolerance; No hair loss  MUSCULOSKELETAL: No joint pain or swelling; No muscle, back, or extremity pain  PSYCHIATRIC: No depression, anxiety, mood swings, or difficulty sleeping  HEME/LYMPH: No easy bruising, or bleeding gums  ALLERY AND IMMUNOLOGIC: No hives or eczema    T(C): 37.1 (01-05-22 @ 11:28), Max: 37.1 (01-05-22 @ 11:28)  HR: 90 (01-05-22 @ 11:28) (68 - 90)  BP: 130/69 (01-05-22 @ 11:28) (113/71 - 155/84)  RR: 19 (01-05-22 @ 11:28) (16 - 21)  SpO2: 98% (01-05-22 @ 11:28) (98% - 100%)    PHYSICAL EXAM:  GENERAL: NAD, well-groomed, well-developed  HEAD:  Atraumatic, Normocephalic  EYES: EOMI, PERRLA, conjunctiva and sclera clear  ENMT: No tonsillar erythema, exudates, or enlargement; Moist mucous membranes, Good dentition, No lesions  NECK: Supple, No JVD, Normal thyroid  NERVOUS SYSTEM:  Alert & Oriented X3, Good concentration; moves all extremities   CHEST/LUNG: Clear to percussion bilaterally; No rales, rhonchi, wheezing, or rubs  HEART: Regular rate and rhythm; No murmurs, rubs, or gallops  ABDOMEN: Soft, Nontender, Nondistended; Bowel sounds present  EXTREMITIES:  2+ Peripheral Pulses, No clubbing, cyanosis, or edema  LYMPH: No lymphadenopathy noted  SKIN: No rashes or lesions    Consultant(s) Notes Reviewed:  [x ] YES  [ ] NO  Care Discussed with Consultants/Other Providers [ x] YES  [ ] NO    LAB:                        9.0    7.31  )-----------( 50       ( 05 Jan 2022 04:30 )             26.6     01-05    134<L>  |  98  |  7<L>  ----------------------------<  112<H>  3.2<L>   |  21  |  0.7    Ca    7.5<L>      05 Jan 2022 04:30  Mg     1.7     01-05    TPro  6.4  /  Alb  3.2<L>  /  TBili  1.9<H>  /  DBili  x   /  AST  118<H>  /  ALT  32  /  AlkPhos  96  01-05    LIVER FUNCTIONS - ( 05 Jan 2022 04:30 )  Alb: 3.2 g/dL / Pro: 6.4 g/dL / ALK PHOS: 96 U/L / ALT: 32 U/L / AST: 118 U/L / GGT: x               Drug Dosing Weight  Weight (kg): 72.6 (04 Jan 2022 14:32)      I&O's Summary    04 Jan 2022 07:01  -  05 Jan 2022 07:00  --------------------------------------------------------  IN: 300 mL / OUT: 0 mL / NET: 300 mL          RADIOLOGY & ADDITIONAL TESTS:  Imaging Personally Reviewed:  [x] YES  [ ] NO        MEDS:  cefTRIAXone   IVPB 1000 milliGRAM(s) IV Intermittent every 24 hours  chlorhexidine 4% Liquid 1 Application(s) Topical daily  folic acid 1 milliGRAM(s) Oral daily  lactulose Syrup 10 Gram(s) Oral daily  multivitamin 1 Tablet(s) Oral daily  pantoprazole    Tablet 40 milliGRAM(s) Oral two times a day  rifAXIMin 550 milliGRAM(s) Oral two times a day  thiamine 100 milliGRAM(s) Oral daily

## 2022-01-05 NOTE — DISCHARGE NOTE PROVIDER - CARE PROVIDER_API CALL
Chantal Thrasher (MD)  Gastroenterology  [address] [place] [address]  Phone: [phone]  Fax: [phone]  Follow Up Time:

## 2022-01-05 NOTE — PROGRESS NOTE ADULT - ASSESSMENT
IMPRESSION:    GI bleed sp EGD  Liver cirrhosis/ esophageal sp prior banding  Alcohol abuse  ascites  covid+      PLAN:    CNS: Avoid CNS depressant, thiamine/ Folic acid, monitor for withdrawal    HEENT:  Oral care    PULMONARY:  HOB @ 45 degrees, aspiration precaution, if pox > 94 %, no need for Decadron    CARDIOVASCULAR: IVF    GI: Protonix drips                                          Feeding feeding per GI    RENAL:  F/u  lytes.  Correct as needed. accurate I/O    INFECTIOUS DISEASE: Trend markers    HEMATOLOGICAL:  DVT prophylaxis. LE doppler, serial CBC    ENDOCRINE:  Follow up FS.  Insulin protocol if needed.    CODE STATUS: FULL CODE

## 2022-01-05 NOTE — DISCHARGE NOTE PROVIDER - HOSPITAL COURSE
51M w/ h/o EtOH use disorder, alcoholic liver cirrhosis, ascites (tapped before), and esophageal varices (s/p band ligation 4 months ago at [facility]) p/w hematemesis and melena.    Initially presenting s/p 6 episodes of hematemesis a/w melena. EGD at [facility] 4 months ago showed esophageal varices that were band ligated. MELD score 14 which estimates 3-month mortality at 6%. CHILD class A. Underwent EGD 1/3 which demonstrated evidence of blood in stomach, but no active bleeding site seen. EGD limited by clot in stomach. CTAP w/ IV contrast also demonstrated no evidence of active GI bleed. No ascites visualized on US abdomen. Repeat EGD 1/4 demonstrated a 5 cm erythematous linear erosion in the gastric cardia near the GE junction, consistent w/ Rosaura Murphy tear which is now healing w/o evidence of active bleeding. Given 1u pRBC total this admission for anemia 2/2 UGIB. Of note, patient started on abx for 5 days total for SBP prophylaxis.     Hospital course c/b COVID-19 incidentally discovered on admission screen. Currently saturating well on RA. No reported CP or SOB. Admission CXR clear. No GGO's noted in portion of lung fields visualized in CTAP. Per ID, pt does not meet criteria for available COVID-19 therapeutics.     Given h/o alcohol use disorder likely leading to patient's liver cirrhosis, pt started on thiamine, folic acid, and multivitamin. Last drink was one day prior to admission. 51M w/ h/o EtOH use disorder, alcoholic liver cirrhosis, ascites (tapped before), and esophageal varices (s/p band ligation 4 months ago at [facility]) p/w hematemesis and melena.    Initially presenting s/p 6 episodes of hematemesis a/w melena. EGD at [facility] 4 months ago showed esophageal varices that were band ligated. MELD score 14 which estimates 3-month mortality at 6%. CHILD class A. Underwent EGD 1/3 which demonstrated evidence of blood in stomach, but no active bleeding site seen. EGD limited by clot in stomach. CTAP w/ IV contrast also demonstrated no evidence of active GI bleed. No ascites visualized on US abdomen. Repeat EGD 1/4 demonstrated a 5 cm erythematous linear erosion in the gastric cardia near the GE junction, consistent w/ Rosaura Murphy tear which is now healing w/o evidence of active bleeding. Given 1u pRBC total this admission for anemia 2/2 UGIB. Of note, patient started on abx for 5 days total for SBP prophylaxis.     Hospital course c/b COVID-19 incidentally discovered on admission screen. Currently saturating well on RA. No reported CP or SOB. Admission CXR clear. No GGO's noted in portion of lung fields visualized in CTAP. Per ID, pt does not meet criteria for available COVID-19 therapeutics.     Given h/o alcohol use disorder likely leading to patient's liver cirrhosis, pt started on thiamine, folic acid, and multivitamin. Last drink was one day prior to admission.    Attending Note:  Patient seen and examined independently. I personally had a face-to-face encounter with the patient, examined the patient myself, personally reviewed labs & Radiology images,  and reviewed the plan of care with the housestaff. Agree with resident's note but my note supersedes that of the resident in the matters hereby listed.     d/c to home.   Meds per rec  f/u PCP

## 2022-01-05 NOTE — PROGRESS NOTE ADULT - SUBJECTIVE AND OBJECTIVE BOX
Over Night Events: events noted, ID reviewed    PHYSICAL EXAM    ICU Vital Signs Last 24 Hrs  T(C): 36.6 (05 Jan 2022 04:00), Max: 37.2 (04 Jan 2022 08:49)  T(F): 97.9 (05 Jan 2022 04:00), Max: 98.9 (04 Jan 2022 08:49)  HR: 73 (05 Jan 2022 04:00) (68 - 84)  BP: 155/84 (05 Jan 2022 04:00) (123/68 - 155/84)  BP(mean): 113 (05 Jan 2022 04:00) (99 - 113)  RR: 18 (05 Jan 2022 04:00) (16 - 21)  SpO2: 99% (05 Jan 2022 04:00) (98% - 100%)      General: Ill looking  HEENT: KARLA             Lymph Nodes: No cervical LN   Lungs: Bilateral BS  Cardiovascular: Regular   Abdomen: Soft, Positive BS  Extremities: No clubbing   Skin: Warm  Neurological: Non focal       01-04-22 @ 07:01  -  01-05-22 @ 07:00  --------------------------------------------------------  IN:    Oral Fluid: 300 mL  Total IN: 300 mL    OUT:  Total OUT: 0 mL    Total NET: 300 mL          LABS:                          7.4    5.85  )-----------( 54       ( 04 Jan 2022 04:30 )             22.0                                               01-04    142  |  105  |  10  ----------------------------<  123<H>  3.9   |  23  |  0.7    Ca    7.3<L>      04 Jan 2022 04:30  Mg     1.7     01-04    TPro  5.8<L>  /  Alb  2.8<L>  /  TBili  1.4<H>  /  DBili  x   /  AST  95<H>  /  ALT  23  /  AlkPhos  88  01-04      PT/INR - ( 04 Jan 2022 11:34 )   PT: 18.70 sec;   INR: 1.63 ratio         PTT - ( 04 Jan 2022 11:34 )  PTT:34.4 sec                                                                                     LIVER FUNCTIONS - ( 04 Jan 2022 04:30 )  Alb: 2.8 g/dL / Pro: 5.8 g/dL / ALK PHOS: 88 U/L / ALT: 23 U/L / AST: 95 U/L / GGT: x                                                                                                                                       MEDICATIONS  (STANDING):  cefTRIAXone   IVPB 1000 milliGRAM(s) IV Intermittent every 24 hours  chlorhexidine 4% Liquid 1 Application(s) Topical daily  folic acid 1 milliGRAM(s) Oral daily  lactulose Syrup 10 Gram(s) Oral daily  multivitamin 1 Tablet(s) Oral daily  pantoprazole    Tablet 40 milliGRAM(s) Oral two times a day  rifAXIMin 550 milliGRAM(s) Oral two times a day  thiamine 100 milliGRAM(s) Oral daily

## 2022-01-05 NOTE — PROGRESS NOTE ADULT - ASSESSMENT
51M w/ h/o EtOH use disorder, alcoholic liver cirrhosis, and esophageal varices (s/p band ligation 4 months ago at [facility]) p/w hematemesis and melena.    #Rosaura-Murphy Tear  #Acute Microcytic Anemia  #Esophageal Varices, h/o  #Alcoholic Liver Cirrhosis  Initially presenting s/p 6 episodes of hematemesis a/w melena. EGD at [facility] 4 months ago showed esophageal varices that were band ligated. MELD score 14 which estimates 3-month mortality at 6%. CHILD class A. Underwent EGD 1/3 which demonstrated evidence of blood in stomach, but no active bleeding site seen. EGD limited by clot in stomach. CTAP w/ IV contrast also demonstrated no evidence of active GI bleed. No ascites visualized on US abdomen. Repeat EGD  demonstrated a 5 cm erythematous linear erosion in the gastric cardia near the GE junction, consistent w/ Rosaura Murphy tear which is now healing w/o evidence of active bleeding. Given 1u pRBC total this admission for anemia 2/2 UGIB.   - d/c octreotide gtt  - transition pantoprazole from gtt to 40mg PO BID (BID for 2weeks, then QD for 6 weeks)  - c/w ceftriaxone 1000 mg daily for 5 days (SBP ppx; transition to cipro 500mg PO BID on d/c per ID)  - c/w rifaximin 550 mg twice daily  - c/w lactulose (titrate to 2-3 BM per day)  - continue to hold home Lasix and propanolol  - Daily PT/INR (Goal: INR <2.5)  - Trend Hb on CBC BID; transfuse per GI (likely is Hb<8)  - Maintain active T&S  - f/u TTE (ordered to assess baseline EF)    #Asymptomatic COVID-19 Infection  Incidentally found to have COVID-19 on admission screen. Currently saturating well on RA. No reported CP or SOB. Admission CXR clear. No GGO's noted in portion of lung fields visualized in CTAP. Per ID, pt does not meet criteria for available COVID-19 therapeutics.   - Reconsult ID if oxygen requirements change    #Alcohol Use Disorder  #Thiamine Deficiency, suspected  #Folate Deficiency, suspected  #Vitamin deficiency, suspected  H/o alcohol use disorder leading to alcoholic liver cirrhosis Last drink was one day prior to admission.  - Monitor for alcohol withdrawal  - c/w thiamine 100mg PO QD  - c/w folic acid 1mg PO QD  - c/w multivitamin 1 tab PO QD    DVT PPX: heparin 5000U SQ Q8H  GI PPX: pantoprazole gtt  DIET: NPO for EGD  ACTIVITY: IAT  CODE STATUS: Full Code  DISPOSITION: From Home    PENDINu pRBC, EGD 51M w/ h/o EtOH use disorder, alcoholic liver cirrhosis, and esophageal varices (s/p band ligation 4 months ago at [facility]) p/w hematemesis and melena.    #Rosaura-Murphy Tear  #Acute Microcytic Anemia  #Esophageal Varices, h/o  #Alcoholic Liver Cirrhosis  Initially presenting s/p 6 episodes of hematemesis a/w melena. EGD at [facility] 4 months ago showed esophageal varices that were band ligated. MELD score 14 which estimates 3-month mortality at 6%. CHILD class A. Underwent EGD 1/3 which demonstrated evidence of blood in stomach, but no active bleeding site seen. EGD limited by clot in stomach. CTAP w/ IV contrast also demonstrated no evidence of active GI bleed. No ascites visualized on US abdomen. Repeat EGD 1/4 demonstrated a 5 cm erythematous linear erosion in the gastric cardia near the GE junction, consistent w/ Rosaura Murphy tear which is now healing w/o evidence of active bleeding. Given 1u pRBC total this admission for anemia 2/2 UGIB.   - d/c octreotide gtt  - transition pantoprazole from gtt to 40mg PO BID (BID for 2weeks, then QD for 6 weeks)  - c/w ceftriaxone 1000mg IV QD (SBP ppx; transition to cipro 500mg PO BID on d/c per ID; total abx length 5 days per GI or 7 days per ID)  - c/w rifaximin 550mg PO BID  - c/w lactulose (titrate to 2-3 BM per day)  - continue to hold home Lasix and propanolol  - Daily PT/INR (Goal: INR <2.5)  - Trend Hb on CBC BID; transfuse per GI (likely is Hb<8)  - Maintain active T&S  - f/u TTE (ordered to assess baseline EF)    #Asymptomatic COVID-19 Infection  Incidentally found to have COVID-19 on admission screen. Currently saturating well on RA. No reported CP or SOB. Admission CXR clear. No GGO's noted in portion of lung fields visualized in CTAP. Per ID, pt does not meet criteria for available COVID-19 therapeutics.   - Reconsult ID if oxygen requirements change    #Alcohol Use Disorder  #Thiamine Deficiency, suspected  #Folate Deficiency, suspected  #Vitamin deficiency, suspected  H/o alcohol use disorder leading to alcoholic liver cirrhosis Last drink was one day prior to admission.  - Monitor for alcohol withdrawal  - c/w thiamine 100mg PO QD  - c/w folic acid 1mg PO QD  - c/w multivitamin 1 tab PO QD  - CATCH Team consulted; f/u recs    DVT PPX: SCD (no chemoprophylaxis in setting of GI bleed)  GI PPX: pantoprazole 40mg PO BID  DIET: Regular  ACTIVITY: IAT  CODE STATUS: Full Code  DISPOSITION: From Home    PENDING: CATCH team consult; dispo planning 51M w/ h/o EtOH use disorder, alcoholic liver cirrhosis, and esophageal varices (s/p band ligation 4 months ago at [facility]) p/w hematemesis and melena.    #Rosaura-Murphy Tear  #Acute Microcytic Anemia  #Esophageal Varices, h/o  #Alcoholic Liver Cirrhosis  Initially presenting s/p 6 episodes of hematemesis a/w melena. EGD at [facility] 4 months ago showed esophageal varices that were band ligated. MELD score 14 which estimates 3-month mortality at 6%. CHILD class A. Underwent EGD 1/3 which demonstrated evidence of blood in stomach, but no active bleeding site seen. EGD limited by clot in stomach. CTAP w/ IV contrast also demonstrated no evidence of active GI bleed. No ascites visualized on US abdomen. Repeat EGD 1/4 demonstrated a 5 cm erythematous linear erosion in the gastric cardia near the GE junction, consistent w/ Rosaura Murphy tear which is now healing w/o evidence of active bleeding. Given 1u pRBC total this admission for anemia 2/2 UGIB.   - d/c octreotide gtt  - transition pantoprazole from gtt to 40mg PO BID (BID for 2weeks, then QD for 6 weeks)  - c/w ceftriaxone 1000mg IV QD (SBP ppx; transition to cipro 500mg PO BID on d/c per ID; total abx length 5 days per GI or 7 days per ID)  - c/w rifaximin 550mg PO BID  - c/w lactulose (titrate to 2-3 BM per day)  - continue to hold home Lasix and propanolol  - Daily PT/INR (Goal: INR <2.5)  - Trend Hb on CBC BID; transfuse per GI (likely is Hb<8)  - Maintain active T&S  - f/u TTE (ordered on admission to assess baseline EF)  - f/u outpatient at [facility] on d/c    #Asymptomatic COVID-19 Infection  Incidentally found to have COVID-19 on admission screen. Currently saturating well on RA. No reported CP or SOB. Admission CXR clear. No GGO's noted in portion of lung fields visualized in CTAP. Per ID, pt does not meet criteria for available COVID-19 therapeutics.   - Reconsult ID if oxygen requirements change    #Alcohol Use Disorder  #Thiamine Deficiency, suspected  #Folate Deficiency, suspected  #Vitamin deficiency, suspected  H/o alcohol use disorder leading to alcoholic liver cirrhosis Last drink was one day prior to admission.  - Monitor for alcohol withdrawal  - c/w thiamine 100mg PO QD  - c/w folic acid 1mg PO QD  - c/w multivitamin 1 tab PO QD  - CATCH Team consulted; f/u recs    DVT PPX: SCD (no chemoprophylaxis in setting of GI bleed)  GI PPX: pantoprazole 40mg PO BID  DIET: Regular  ACTIVITY: IAT  CODE STATUS: Full Code  DISPOSITION: From Home    PENDING: CATCH team consult; dispo planning

## 2022-01-05 NOTE — CHART NOTE - NSCHARTNOTEFT_GEN_A_CORE
************************************************  Chris Tinoco MD (PGY-1)  Spectra: [phone]  ************************************************    Transfer from: [address]    Transfer to: Medicine    Sign out given to:     HPI / HOSPITAL COURSE:  51M w/ h/o EtOH use disorder, alcoholic liver cirrhosis, ascites (tapped before), and esophageal varices (s/p band ligation 4 months ago at [facility]) p/w hematemesis and melena.    Initially presenting s/p 6 episodes of hematemesis a/w melena. EGD at [facility] 4 months ago showed esophageal varices that were band ligated. MELD score 14 which estimates 3-month mortality at 6%. CHILD class A. Underwent EGD 1/3 which demonstrated evidence of blood in stomach, but no active bleeding site seen. EGD limited by clot in stomach. CTAP w/ IV contrast also demonstrated no evidence of active GI bleed. No ascites visualized on US abdomen. Repeat EGD 1/4 demonstrated a 5 cm erythematous linear erosion in the gastric cardia near the GE junction, consistent w/ Rosaura Murphy tear which is now healing w/o evidence of active bleeding. Given 1u pRBC total this admission for anemia 2/2 UGIB. Of note, patient started on abx for 5 days total for SBP prophylaxis.     Hospital course c/b COVID-19 incidentally discovered on admission screen. Currently saturating well on RA. No reported CP or SOB. Admission CXR clear. No GGO's noted in portion of lung fields visualized in CTAP. Per ID, pt does not meet criteria for available COVID-19 therapeutics.     Given h/o alcohol use disorder likely leading to patient's liver cirrhosis, pt started on thiamine, folic acid, and multivitamin. Last drink was one day prior to admission.    ASSESSMENT & PLAN:   51M w/ h/o EtOH use disorder, alcoholic liver cirrhosis, and esophageal varices (s/p band ligation 4 months ago at [facility]) p/w hematemesis and melena.    #Rosaura-Murphy Tear  #Acute Microcytic Anemia  #Esophageal Varices, h/o  #Alcoholic Liver Cirrhosis  Initially presenting s/p 6 episodes of hematemesis a/w melena. EGD at [facility] 4 months ago showed esophageal varices that were band ligated. MELD score 14 which estimates 3-month mortality at 6%. CHILD class A. Underwent EGD 1/3 which demonstrated evidence of blood in stomach, but no active bleeding site seen. EGD limited by clot in stomach. CTAP w/ IV contrast also demonstrated no evidence of active GI bleed. No ascites visualized on US abdomen. Repeat EGD 1/4 demonstrated a 5 cm erythematous linear erosion in the gastric cardia near the GE junction, consistent w/ Rosaura Murphy tear which is now healing w/o evidence of active bleeding. Given 1u pRBC total this admission for anemia 2/2 UGIB.   - c/w pantoprazole 40mg PO BID (BID for 2weeks, then QD for 6 weeks)  - c/w ceftriaxone 1000mg IV QD (SBP ppx; transition to cipro 500mg PO BID on d/c per ID; total abx length 5 days per GI or 7 days per ID)  - c/w rifaximin 550mg PO BID  - c/w lactulose (titrate to 2-3 BM per day)  - continue to hold home Lasix and propanolol in setting of acute UGIB  - Daily PT/INR (Goal: INR <2.5)  - Trend Hb on CBC daily; transfuse if Hb<7  - Maintain active T&S (last 1/4; blood consent in chart)    #Asymptomatic COVID-19 Infection  Incidentally found to have COVID-19 on admission screen. Currently saturating well on RA. No reported CP or SOB. Admission CXR clear. No GGO's noted in portion of lung fields visualized in CTAP. Per ID, pt does not meet criteria for available COVID-19 therapeutics.   - Reconsult ID if oxygen requirements change    #Alcohol Use Disorder  #Thiamine Deficiency, suspected  #Folate Deficiency, suspected  #Vitamin deficiency, suspected  H/o alcohol use disorder leading to alcoholic liver cirrhosis Last drink was one day prior to admission.  - Monitor for alcohol withdrawal  - c/w thiamine 100mg PO QD  - c/w folic acid 1mg PO QD  - c/w multivitamin 1 tab PO QD  - CATCH Team consulted; f/u recs    DVT PPX: SCD (no chemoprophylaxis in setting of GI bleed)  GI PPX: pantoprazole 40mg PO BID  DIET: Regular  ACTIVITY: IAT  CODE STATUS: Full Code  DISPOSITION: From Home    PENDING: CATCH team consult; dispo planning    FOR FOLLOW UP:  [ ] CATCH team consult  [ ] trend Hb daily  [ ] If hemodynamically unstable or repeat hematemesis/melena, repeat STAT CBC and re-call GI service (GI Fellow: Alejandro) ************************************************  Chris Tinoco MD (PGY-1)  Spectra: [phone]  ************************************************    Transfer from: [address]    Transfer to: Medicine    Sign out given to:     HPI / HOSPITAL COURSE:  51M w/ h/o EtOH use disorder, alcoholic liver cirrhosis, ascites (tapped before), and esophageal varices (s/p band ligation 4 months ago at [facility]) p/w hematemesis and melena.    Initially presenting s/p 6 episodes of hematemesis a/w melena. EGD at [facility] 4 months ago showed esophageal varices that were band ligated. MELD score 14 which estimates 3-month mortality at 6%. CHILD class A. Underwent EGD 1/3 which demonstrated evidence of blood in stomach, but no active bleeding site seen. EGD limited by clot in stomach. CTAP w/ IV contrast also demonstrated no evidence of active GI bleed. No ascites visualized on US abdomen. Repeat EGD 1/4 demonstrated a 5 cm erythematous linear erosion in the gastric cardia near the GE junction, consistent w/ Rosaura Murphy tear which is now healing w/o evidence of active bleeding. Given 1u pRBC total this admission for anemia 2/2 UGIB. Of note, patient started on abx for 5 days total for SBP prophylaxis.     Hospital course c/b COVID-19 incidentally discovered on admission screen. Currently saturating well on RA. No reported CP or SOB. Admission CXR clear. No GGO's noted in portion of lung fields visualized in CTAP. Per ID, pt does not meet criteria for available COVID-19 therapeutics.     Given h/o alcohol use disorder likely leading to patient's liver cirrhosis, pt started on thiamine, folic acid, and multivitamin. Last drink was one day prior to admission.    ASSESSMENT & PLAN:   51M w/ h/o EtOH use disorder, alcoholic liver cirrhosis, and esophageal varices (s/p band ligation 4 months ago at [facility]) p/w hematemesis and melena.    #Rosaura-Murphy Tear  #Acute Microcytic Anemia  #Esophageal Varices, h/o  #Alcoholic Liver Cirrhosis  Initially presenting s/p 6 episodes of hematemesis a/w melena. EGD at [facility] 4 months ago showed esophageal varices that were band ligated. MELD score 14 which estimates 3-month mortality at 6%. CHILD class A. Underwent EGD 1/3 which demonstrated evidence of blood in stomach, but no active bleeding site seen. EGD limited by clot in stomach. CTAP w/ IV contrast also demonstrated no evidence of active GI bleed. No ascites visualized on US abdomen. Repeat EGD 1/4 demonstrated a 5 cm erythematous linear erosion in the gastric cardia near the GE junction, consistent w/ Rosaura Murphy tear which is now healing w/o evidence of active bleeding. Given 1u pRBC total this admission for anemia 2/2 UGIB.   - c/w pantoprazole 40mg PO BID (BID for 2weeks, then QD for 6 weeks)  - c/w ceftriaxone 1000mg IV QD (SBP ppx; transition to cipro 500mg PO BID on d/c per ID; total abx length 5 days per GI or 7 days per ID)  - c/w rifaximin 550mg PO BID  - c/w lactulose (titrate to 2-3 BM per day)  - continue to hold home Lasix and propanolol in setting of acute UGIB  - Daily PT/INR (Goal: INR <2.5)  - Trend Hb on CBC daily; transfuse if Hb<7  - Maintain active T&S (last 1/4; blood consent in chart)  - f/u outpatient at [facility] GI clinic on d/c    #Asymptomatic COVID-19 Infection  Incidentally found to have COVID-19 on admission screen. Currently saturating well on RA. No reported CP or SOB. Admission CXR clear. No GGO's noted in portion of lung fields visualized in CTAP. Per ID, pt does not meet criteria for available COVID-19 therapeutics.   - Reconsult ID if oxygen requirements change    #Alcohol Use Disorder  #Thiamine Deficiency, suspected  #Folate Deficiency, suspected  #Vitamin deficiency, suspected  H/o alcohol use disorder leading to alcoholic liver cirrhosis Last drink was one day prior to admission.  - Monitor for alcohol withdrawal  - c/w thiamine 100mg PO QD  - c/w folic acid 1mg PO QD  - c/w multivitamin 1 tab PO QD  - CATCH Team consulted; f/u recs    DVT PPX: SCD (no chemoprophylaxis in setting of GI bleed)  GI PPX: pantoprazole 40mg PO BID  DIET: Regular  ACTIVITY: IAT  CODE STATUS: Full Code  DISPOSITION: From Home    PENDING: CATCH team consult; dispo planning    FOR FOLLOW UP:  [ ] CATCH team consult  [ ] trend Hb daily  [ ] If hemodynamically unstable or repeat hematemesis/melena, repeat STAT CBC and re-call GI service (GI Fellow: Alejandro)  [ ] f/u outpatient at [facility] GI clinic on d/c

## 2022-01-05 NOTE — PROGRESS NOTE ADULT - ASSESSMENT
51 year old PMH of alcoholic cirrhosis with Esophageal varices s/p band ligation 4 months ago in [facility] and HTN presented to the ER after having 6 vomiting episodes of dark blood. Found to have COVID 19. Admitted to medicine for further management.     #Acute on chronic microcytic anemia 2/2 Upper GI bleed likely esophageal varices  #Thrombocytopenia  #Decompensated Alcoholic Liver Cirrhosis  #ETOH abuse   #COVID-19 infection - asymptomatic   #suspected potassium and magnesium deficiencies - supplement as needed     - monitor cbc q24  - maintain active type and screen   - s/p EGD - he varices were small and no stigmata of bleeding, there was evidence of blood in the stomach which was adequately suctioned to better visualize the stomach. Despite aggressive suctioning, a large clot was obscuring the view of the fundus and could not be fully evaluated.  - Pending Repeat EGD done on 1/4 - 5 cm erythematous linear erosion in the gastric cardia near the GE  - CTA reported gastroesophageal varices, no ascities, did not demonstrate bleeding   - off octreotide and protonix gtt; continue oral protonix q12  - ID input appreciated - continue rocephin for sbp prophylaxis   - continue folic acid, thiamine, multivitamin   - fall, seizure precautions   - CATCH team consult placed   - alcohol cessation counseling don e    Progress Note Handoff  Pending Consults: catch team  Pending Tests: labs  Pending Results: labs  Family Discussion: discussed repeating labs, medication and overall plan of care with pt and medical staff - in agreement with plan of care   Disposition: Home_x____/SNF______/Other_____/Unknown at this time_____    Please call me with any questions at extension [phone]

## 2022-01-06 VITALS
DIASTOLIC BLOOD PRESSURE: 64 MMHG | TEMPERATURE: 97 F | SYSTOLIC BLOOD PRESSURE: 119 MMHG | HEART RATE: 108 BPM | OXYGEN SATURATION: 97 % | RESPIRATION RATE: 18 BRPM

## 2022-01-06 LAB
ALBUMIN SERPL ELPH-MCNC: 3.2 G/DL — LOW (ref 3.5–5.2)
ALP SERPL-CCNC: 120 U/L — HIGH (ref 30–115)
ALT FLD-CCNC: 42 U/L — HIGH (ref 0–41)
ANION GAP SERPL CALC-SCNC: 12 MMOL/L — SIGNIFICANT CHANGE UP (ref 7–14)
ANION GAP SERPL CALC-SCNC: 14 MMOL/L — SIGNIFICANT CHANGE UP (ref 7–14)
AST SERPL-CCNC: 130 U/L — HIGH (ref 0–41)
BASOPHILS # BLD AUTO: 0.02 K/UL — SIGNIFICANT CHANGE UP (ref 0–0.2)
BASOPHILS NFR BLD AUTO: 0.3 % — SIGNIFICANT CHANGE UP (ref 0–1)
BILIRUB SERPL-MCNC: 1.7 MG/DL — HIGH (ref 0.2–1.2)
BUN SERPL-MCNC: 7 MG/DL — LOW (ref 10–20)
BUN SERPL-MCNC: 8 MG/DL — LOW (ref 10–20)
CALCIUM SERPL-MCNC: 7.7 MG/DL — LOW (ref 8.5–10.1)
CALCIUM SERPL-MCNC: 7.8 MG/DL — LOW (ref 8.5–10.1)
CHLORIDE SERPL-SCNC: 100 MMOL/L — SIGNIFICANT CHANGE UP (ref 98–110)
CHLORIDE SERPL-SCNC: 97 MMOL/L — LOW (ref 98–110)
CO2 SERPL-SCNC: 21 MMOL/L — SIGNIFICANT CHANGE UP (ref 17–32)
CO2 SERPL-SCNC: 22 MMOL/L — SIGNIFICANT CHANGE UP (ref 17–32)
CREAT SERPL-MCNC: 0.6 MG/DL — LOW (ref 0.7–1.5)
CREAT SERPL-MCNC: 0.7 MG/DL — SIGNIFICANT CHANGE UP (ref 0.7–1.5)
EOSINOPHIL # BLD AUTO: 0.19 K/UL — SIGNIFICANT CHANGE UP (ref 0–0.7)
EOSINOPHIL NFR BLD AUTO: 3 % — SIGNIFICANT CHANGE UP (ref 0–8)
GLUCOSE SERPL-MCNC: 106 MG/DL — HIGH (ref 70–99)
GLUCOSE SERPL-MCNC: 112 MG/DL — HIGH (ref 70–99)
HCT VFR BLD CALC: 25.6 % — LOW (ref 42–52)
HGB BLD-MCNC: 8.8 G/DL — LOW (ref 14–18)
IMM GRANULOCYTES NFR BLD AUTO: 0.3 % — SIGNIFICANT CHANGE UP (ref 0.1–0.3)
INR BLD: 1.54 RATIO — HIGH (ref 0.65–1.3)
LYMPHOCYTES # BLD AUTO: 2.23 K/UL — SIGNIFICANT CHANGE UP (ref 1.2–3.4)
LYMPHOCYTES # BLD AUTO: 35.7 % — SIGNIFICANT CHANGE UP (ref 20.5–51.1)
MAGNESIUM SERPL-MCNC: 1.8 MG/DL — SIGNIFICANT CHANGE UP (ref 1.8–2.4)
MCHC RBC-ENTMCNC: 25.6 PG — LOW (ref 27–31)
MCHC RBC-ENTMCNC: 34.4 G/DL — SIGNIFICANT CHANGE UP (ref 32–37)
MCV RBC AUTO: 74.4 FL — LOW (ref 80–94)
MONOCYTES # BLD AUTO: 0.62 K/UL — HIGH (ref 0.1–0.6)
MONOCYTES NFR BLD AUTO: 9.9 % — HIGH (ref 1.7–9.3)
NEUTROPHILS # BLD AUTO: 3.16 K/UL — SIGNIFICANT CHANGE UP (ref 1.4–6.5)
NEUTROPHILS NFR BLD AUTO: 50.8 % — SIGNIFICANT CHANGE UP (ref 42.2–75.2)
NRBC # BLD: 0 /100 WBCS — SIGNIFICANT CHANGE UP (ref 0–0)
PLATELET # BLD AUTO: 69 K/UL — LOW (ref 130–400)
POTASSIUM SERPL-MCNC: 3.1 MMOL/L — LOW (ref 3.5–5)
POTASSIUM SERPL-MCNC: 3.8 MMOL/L — SIGNIFICANT CHANGE UP (ref 3.5–5)
POTASSIUM SERPL-SCNC: 3.1 MMOL/L — LOW (ref 3.5–5)
POTASSIUM SERPL-SCNC: 3.8 MMOL/L — SIGNIFICANT CHANGE UP (ref 3.5–5)
PROT SERPL-MCNC: 6.4 G/DL — SIGNIFICANT CHANGE UP (ref 6–8)
PROTHROM AB SERPL-ACNC: 17.6 SEC — HIGH (ref 9.95–12.87)
RBC # BLD: 3.44 M/UL — LOW (ref 4.7–6.1)
RBC # FLD: 15.7 % — HIGH (ref 11.5–14.5)
SODIUM SERPL-SCNC: 133 MMOL/L — LOW (ref 135–146)
SODIUM SERPL-SCNC: 133 MMOL/L — LOW (ref 135–146)
WBC # BLD: 6.24 K/UL — SIGNIFICANT CHANGE UP (ref 4.8–10.8)
WBC # FLD AUTO: 6.24 K/UL — SIGNIFICANT CHANGE UP (ref 4.8–10.8)

## 2022-01-06 PROCEDURE — 99239 HOSP IP/OBS DSCHRG MGMT >30: CPT

## 2022-01-06 RX ORDER — POTASSIUM CHLORIDE 20 MEQ
20 PACKET (EA) ORAL
Refills: 0 | Status: COMPLETED | OUTPATIENT
Start: 2022-01-06 | End: 2022-01-06

## 2022-01-06 RX ORDER — POTASSIUM CHLORIDE 20 MEQ
20 PACKET (EA) ORAL ONCE
Refills: 0 | Status: COMPLETED | OUTPATIENT
Start: 2022-01-06 | End: 2022-01-06

## 2022-01-06 RX ORDER — PANTOPRAZOLE SODIUM 20 MG/1
1 TABLET, DELAYED RELEASE ORAL
Qty: 122 | Refills: 0
Start: 2022-01-06 | End: 2022-03-07

## 2022-01-06 RX ORDER — CIPROFLOXACIN LACTATE 400MG/40ML
1 VIAL (ML) INTRAVENOUS
Qty: 2 | Refills: 0
Start: 2022-01-06 | End: 2022-01-06

## 2022-01-06 RX ORDER — PANTOPRAZOLE SODIUM 20 MG/1
1 TABLET, DELAYED RELEASE ORAL
Qty: 60 | Refills: 0
Start: 2022-01-06 | End: 2022-02-04

## 2022-01-06 RX ORDER — THIAMINE MONONITRATE (VIT B1) 100 MG
1 TABLET ORAL
Qty: 30 | Refills: 0
Start: 2022-01-06 | End: 2022-02-04

## 2022-01-06 RX ADMIN — Medication 50 MILLIEQUIVALENT(S): at 12:40

## 2022-01-06 RX ADMIN — Medication 100 MILLIGRAM(S): at 11:30

## 2022-01-06 RX ADMIN — PANTOPRAZOLE SODIUM 40 MILLIGRAM(S): 20 TABLET, DELAYED RELEASE ORAL at 16:58

## 2022-01-06 RX ADMIN — Medication 1 TABLET(S): at 11:30

## 2022-01-06 RX ADMIN — Medication 50 MILLIEQUIVALENT(S): at 15:32

## 2022-01-06 RX ADMIN — PANTOPRAZOLE SODIUM 40 MILLIGRAM(S): 20 TABLET, DELAYED RELEASE ORAL at 05:21

## 2022-01-06 RX ADMIN — Medication 20 MILLIEQUIVALENT(S): at 12:41

## 2022-01-06 RX ADMIN — Medication 1 MILLIGRAM(S): at 11:31

## 2022-01-06 NOTE — DISCHARGE NOTE NURSING/CASE MANAGEMENT/SOCIAL WORK - PATIENT PORTAL LINK FT
You can access the FollowMyHealth Patient Portal offered by [facility] by registering at the following website: [email or web address]. By joining [employer]s FollowMyHealth portal, you will also be able to view your health information using other applications (apps) compatible with our system.

## 2022-01-06 NOTE — CHART NOTE - NSCHARTNOTEFT_GEN_A_CORE
I attended COVID rounds with the Hospitalist and housestaff and called family.    [   ]  I attempted to reach                      but was unable to leave a message.  [   ]  I left a message with   [ x  ]  reached  Joey Zelaya ( cousin/ contact) [phone]   and gave an update on the patient's condition.

## 2022-01-06 NOTE — DISCHARGE NOTE NURSING/CASE MANAGEMENT/SOCIAL WORK - NSDCPEFALRISK_GEN_ALL_CORE
For information on Fall & Injury Prevention, visit: [email or web address] OR  [email or web address] OR  https://www.cdc.gov/steadi/patient.html

## 2022-01-06 NOTE — CONSULT NOTE ADULT - SUBJECTIVE AND OBJECTIVE BOX
Addiction medicine consult placed for Jose Salazar. Per resident, no detox recommendations necessary at this time, patient's symptoms are currently well-controlled, though patient does require referral for services.    CATCH team social workers aware, will follow patient. Addiction medicine consult placed for Jose Salazar. Per resident, no detox recommendations necessary at this time, patient's symptoms are currently well-controlled, though patient does require referral for services.    CATCH team social workers aware, will follow patient.

## 2023-10-10 NOTE — DISCHARGE NOTE NURSING/CASE MANAGEMENT/SOCIAL WORK - NSDCPEPT PROEDMA_GEN_ALL_CORE
Thank you for allowing us to participate in your care today!     Please do not hesitate to contact our office with any follow-up questions or concerns. We can be contacted by phone or through your patient portal via the "[employer] everette.    TAH Hanna  Office hours: Monday - Friday 8 am to 5 pm    [facility] [address], [address]  [place] [address]  Phone: [phone]  Fax: [phone]    For scheduling:  Imaging (CT, x-ray): [phone]  Pulmonary function tests: [phone]  Sleep studies: [phone]    
Yes

## 2025-06-09 NOTE — PRE-ANESTHESIA EVALUATION ADULT - NSANTHPMHFT_GEN_ALL_CORE
[facility]  Discharge Note  Short Stay    Procedure(s) (LRB):  Right piriformis + Right GTB with ultrasound (Right)      OUTCOME: Patient tolerated treatment/procedure well without complication and is now ready for discharge.    DISPOSITION: Home or Self Care    FINAL DIAGNOSIS:  <principal problem not specified>    FOLLOWUP: In clinic    DISCHARGE INSTRUCTIONS:  No discharge procedures on file.     TIME SPENT ON DISCHARGE: 10 minutes  
Cirrhosis
GI Bleed